# Patient Record
Sex: MALE | Race: WHITE | NOT HISPANIC OR LATINO | Employment: OTHER | URBAN - METROPOLITAN AREA
[De-identification: names, ages, dates, MRNs, and addresses within clinical notes are randomized per-mention and may not be internally consistent; named-entity substitution may affect disease eponyms.]

---

## 2020-01-01 ENCOUNTER — APPOINTMENT (OUTPATIENT)
Dept: GENERAL RADIOLOGY | Facility: HOSPITAL | Age: 65
End: 2020-01-01

## 2020-01-01 ENCOUNTER — APPOINTMENT (OUTPATIENT)
Dept: ULTRASOUND IMAGING | Facility: HOSPITAL | Age: 65
End: 2020-01-01

## 2020-01-01 ENCOUNTER — APPOINTMENT (OUTPATIENT)
Dept: CT IMAGING | Facility: HOSPITAL | Age: 65
End: 2020-01-01

## 2020-01-01 ENCOUNTER — ANESTHESIA (OUTPATIENT)
Dept: GASTROENTEROLOGY | Facility: HOSPITAL | Age: 65
End: 2020-01-01

## 2020-01-01 ENCOUNTER — ANESTHESIA EVENT (OUTPATIENT)
Dept: GASTROENTEROLOGY | Facility: HOSPITAL | Age: 65
End: 2020-01-01

## 2020-01-01 ENCOUNTER — HOSPITAL ENCOUNTER (INPATIENT)
Facility: HOSPITAL | Age: 65
LOS: 14 days | End: 2020-10-18
Attending: EMERGENCY MEDICINE | Admitting: INTERNAL MEDICINE

## 2020-01-01 ENCOUNTER — PREP FOR SURGERY (OUTPATIENT)
Dept: OTHER | Facility: HOSPITAL | Age: 65
End: 2020-01-01

## 2020-01-01 VITALS
SYSTOLIC BLOOD PRESSURE: 123 MMHG | RESPIRATION RATE: 33 BRPM | DIASTOLIC BLOOD PRESSURE: 67 MMHG | TEMPERATURE: 100.2 F | OXYGEN SATURATION: 94 %

## 2020-01-01 VITALS
BODY MASS INDEX: 30.07 KG/M2 | TEMPERATURE: 100.5 F | OXYGEN SATURATION: 41 % | RESPIRATION RATE: 17 BRPM | HEIGHT: 68 IN | WEIGHT: 198.41 LBS | DIASTOLIC BLOOD PRESSURE: 26 MMHG | SYSTOLIC BLOOD PRESSURE: 48 MMHG

## 2020-01-01 DIAGNOSIS — R55 SYNCOPE, UNSPECIFIED SYNCOPE TYPE: ICD-10-CM

## 2020-01-01 DIAGNOSIS — K74.60 HEPATIC CIRRHOSIS, UNSPECIFIED HEPATIC CIRRHOSIS TYPE, UNSPECIFIED WHETHER ASCITES PRESENT (HCC): ICD-10-CM

## 2020-01-01 DIAGNOSIS — R57.1 HYPOVOLEMIC SHOCK (HCC): ICD-10-CM

## 2020-01-01 DIAGNOSIS — K92.2 UPPER GI BLEED: Primary | ICD-10-CM

## 2020-01-01 DIAGNOSIS — S01.511A LIP LACERATION, INITIAL ENCOUNTER: ICD-10-CM

## 2020-01-01 DIAGNOSIS — K92.2 ACUTE UPPER GASTROINTESTINAL BLEEDING: ICD-10-CM

## 2020-01-01 LAB
ABO GROUP BLD: NORMAL
ALBUMIN FLD-MCNC: <0.4 G/DL
ALBUMIN SERPL-MCNC: 1.8 G/DL (ref 3.5–5.2)
ALBUMIN SERPL-MCNC: 2 G/DL (ref 3.5–5.2)
ALBUMIN SERPL-MCNC: 2.2 G/DL (ref 3.5–5.2)
ALBUMIN SERPL-MCNC: 2.3 G/DL (ref 3.5–5.2)
ALBUMIN SERPL-MCNC: 2.4 G/DL (ref 3.5–5.2)
ALBUMIN SERPL-MCNC: 2.5 G/DL (ref 3.5–5.2)
ALBUMIN SERPL-MCNC: 2.7 G/DL (ref 3.5–5.2)
ALBUMIN SERPL-MCNC: 2.7 G/DL (ref 3.5–5.2)
ALBUMIN SERPL-MCNC: 2.8 G/DL (ref 3.5–5.2)
ALBUMIN/GLOB SERPL: 0.5 G/DL
ALBUMIN/GLOB SERPL: 0.6 G/DL
ALBUMIN/GLOB SERPL: 0.7 G/DL
ALBUMIN/GLOB SERPL: 0.8 G/DL
ALBUMIN/GLOB SERPL: 0.9 G/DL
ALBUMIN/GLOB SERPL: 0.9 G/DL
ALBUMIN/GLOB SERPL: 1 G/DL
ALBUMIN/GLOB SERPL: 1.1 G/DL
ALBUMIN/GLOB SERPL: 1.4 G/DL
ALBUMIN/GLOB SERPL: 1.4 G/DL
ALP SERPL-CCNC: 100 U/L (ref 39–117)
ALP SERPL-CCNC: 101 U/L (ref 39–117)
ALP SERPL-CCNC: 110 U/L (ref 39–117)
ALP SERPL-CCNC: 121 U/L (ref 39–117)
ALP SERPL-CCNC: 130 U/L (ref 39–117)
ALP SERPL-CCNC: 150 U/L (ref 39–117)
ALP SERPL-CCNC: 68 U/L (ref 39–117)
ALP SERPL-CCNC: 70 U/L (ref 39–117)
ALP SERPL-CCNC: 81 U/L (ref 39–117)
ALP SERPL-CCNC: 82 U/L (ref 39–117)
ALP SERPL-CCNC: 89 U/L (ref 39–117)
ALP SERPL-CCNC: 90 U/L (ref 39–117)
ALP SERPL-CCNC: 98 U/L (ref 39–117)
ALP SERPL-CCNC: 98 U/L (ref 39–117)
ALT SERPL W P-5'-P-CCNC: 105 U/L (ref 1–41)
ALT SERPL W P-5'-P-CCNC: 123 U/L (ref 1–41)
ALT SERPL W P-5'-P-CCNC: 127 U/L (ref 1–41)
ALT SERPL W P-5'-P-CCNC: 145 U/L (ref 1–41)
ALT SERPL W P-5'-P-CCNC: 156 U/L (ref 1–41)
ALT SERPL W P-5'-P-CCNC: 16 U/L (ref 1–41)
ALT SERPL W P-5'-P-CCNC: 49 U/L (ref 1–41)
ALT SERPL W P-5'-P-CCNC: 52 U/L (ref 1–41)
ALT SERPL W P-5'-P-CCNC: 57 U/L (ref 1–41)
ALT SERPL W P-5'-P-CCNC: 61 U/L (ref 1–41)
ALT SERPL W P-5'-P-CCNC: 63 U/L (ref 1–41)
ALT SERPL W P-5'-P-CCNC: 71 U/L (ref 1–41)
ALT SERPL W P-5'-P-CCNC: 78 U/L (ref 1–41)
ALT SERPL W P-5'-P-CCNC: 87 U/L (ref 1–41)
AMMONIA BLD-SCNC: 23 UMOL/L (ref 16–60)
AMMONIA BLD-SCNC: 29 UMOL/L (ref 16–60)
AMMONIA BLD-SCNC: 32 UMOL/L (ref 16–60)
AMMONIA BLD-SCNC: 40 UMOL/L (ref 16–60)
AMMONIA BLD-SCNC: 60 UMOL/L (ref 16–60)
AMMONIA BLD-SCNC: 71 UMOL/L (ref 16–60)
AMMONIA BLD-SCNC: 86 UMOL/L (ref 16–60)
AMPHET+METHAMPHET UR QL: NEGATIVE
ANION GAP SERPL CALCULATED.3IONS-SCNC: 10 MMOL/L (ref 5–15)
ANION GAP SERPL CALCULATED.3IONS-SCNC: 11.2 MMOL/L (ref 5–15)
ANION GAP SERPL CALCULATED.3IONS-SCNC: 14.4 MMOL/L (ref 5–15)
ANION GAP SERPL CALCULATED.3IONS-SCNC: 19.1 MMOL/L (ref 5–15)
ANION GAP SERPL CALCULATED.3IONS-SCNC: 3.4 MMOL/L (ref 5–15)
ANION GAP SERPL CALCULATED.3IONS-SCNC: 4.4 MMOL/L (ref 5–15)
ANION GAP SERPL CALCULATED.3IONS-SCNC: 4.4 MMOL/L (ref 5–15)
ANION GAP SERPL CALCULATED.3IONS-SCNC: 5.5 MMOL/L (ref 5–15)
ANION GAP SERPL CALCULATED.3IONS-SCNC: 5.5 MMOL/L (ref 5–15)
ANION GAP SERPL CALCULATED.3IONS-SCNC: 5.8 MMOL/L (ref 5–15)
ANION GAP SERPL CALCULATED.3IONS-SCNC: 6 MMOL/L (ref 5–15)
ANION GAP SERPL CALCULATED.3IONS-SCNC: 6 MMOL/L (ref 5–15)
ANION GAP SERPL CALCULATED.3IONS-SCNC: 6.4 MMOL/L (ref 5–15)
ANION GAP SERPL CALCULATED.3IONS-SCNC: 6.6 MMOL/L (ref 5–15)
ANION GAP SERPL CALCULATED.3IONS-SCNC: 7.1 MMOL/L (ref 5–15)
ANION GAP SERPL CALCULATED.3IONS-SCNC: 7.7 MMOL/L (ref 5–15)
ANION GAP SERPL CALCULATED.3IONS-SCNC: 8.8 MMOL/L (ref 5–15)
ANISOCYTOSIS BLD QL: ABNORMAL
APPEARANCE FLD: CLEAR
APPEARANCE FLD: CLEAR
APTT PPP: 39.1 SECONDS (ref 22.7–35.4)
APTT PPP: 39.4 SECONDS (ref 22.7–35.4)
ARTERIAL PATENCY WRIST A: ABNORMAL
ARTERIAL PATENCY WRIST A: POSITIVE
AST SERPL-CCNC: 104 U/L (ref 1–40)
AST SERPL-CCNC: 111 U/L (ref 1–40)
AST SERPL-CCNC: 147 U/L (ref 1–40)
AST SERPL-CCNC: 171 U/L (ref 1–40)
AST SERPL-CCNC: 202 U/L (ref 1–40)
AST SERPL-CCNC: 232 U/L (ref 1–40)
AST SERPL-CCNC: 301 U/L (ref 1–40)
AST SERPL-CCNC: 339 U/L (ref 1–40)
AST SERPL-CCNC: 357 U/L (ref 1–40)
AST SERPL-CCNC: 50 U/L (ref 1–40)
AST SERPL-CCNC: 89 U/L (ref 1–40)
AST SERPL-CCNC: 90 U/L (ref 1–40)
AST SERPL-CCNC: 91 U/L (ref 1–40)
AST SERPL-CCNC: 99 U/L (ref 1–40)
ATMOSPHERIC PRESS: 747.3 MMHG
ATMOSPHERIC PRESS: 747.4 MMHG
ATMOSPHERIC PRESS: 748.2 MMHG
ATMOSPHERIC PRESS: 748.4 MMHG
ATMOSPHERIC PRESS: 749.3 MMHG
ATMOSPHERIC PRESS: 749.7 MMHG
ATMOSPHERIC PRESS: 750.6 MMHG
ATMOSPHERIC PRESS: 751.4 MMHG
ATMOSPHERIC PRESS: 751.8 MMHG
ATMOSPHERIC PRESS: 752.2 MMHG
ATMOSPHERIC PRESS: 752.5 MMHG
ATMOSPHERIC PRESS: 752.5 MMHG
ATMOSPHERIC PRESS: 753.1 MMHG
ATMOSPHERIC PRESS: 755.3 MMHG
ATMOSPHERIC PRESS: 756.2 MMHG
B PARAPERT DNA SPEC QL NAA+PROBE: NOT DETECTED
B PERT DNA SPEC QL NAA+PROBE: NOT DETECTED
BACTERIA FLD CULT: NORMAL
BACTERIA SPEC RESP CULT: NO GROWTH
BARBITURATES UR QL SCN: NEGATIVE
BASE EXCESS BLDA CALC-SCNC: -2.7 MMOL/L (ref 0–2)
BASE EXCESS BLDA CALC-SCNC: -6.4 MMOL/L (ref 0–2)
BASE EXCESS BLDA CALC-SCNC: -9.4 MMOL/L (ref 0–2)
BASE EXCESS BLDA CALC-SCNC: 3.8 MMOL/L (ref 0–2)
BASE EXCESS BLDA CALC-SCNC: 3.9 MMOL/L (ref 0–2)
BASE EXCESS BLDA CALC-SCNC: 4.7 MMOL/L (ref 0–2)
BASE EXCESS BLDA CALC-SCNC: 6.1 MMOL/L (ref 0–2)
BASE EXCESS BLDA CALC-SCNC: 6.4 MMOL/L (ref 0–2)
BASE EXCESS BLDA CALC-SCNC: 7.1 MMOL/L (ref 0–2)
BASE EXCESS BLDA CALC-SCNC: 7.3 MMOL/L (ref 0–2)
BASE EXCESS BLDA CALC-SCNC: 7.4 MMOL/L (ref 0–2)
BASE EXCESS BLDA CALC-SCNC: 7.4 MMOL/L (ref 0–2)
BASE EXCESS BLDA CALC-SCNC: 7.7 MMOL/L (ref 0–2)
BASOPHILS # BLD AUTO: 0.04 10*3/MM3 (ref 0–0.2)
BASOPHILS # BLD AUTO: 0.04 10*3/MM3 (ref 0–0.2)
BASOPHILS # BLD AUTO: 0.05 10*3/MM3 (ref 0–0.2)
BASOPHILS # BLD AUTO: 0.06 10*3/MM3 (ref 0–0.2)
BASOPHILS # BLD AUTO: 0.07 10*3/MM3 (ref 0–0.2)
BASOPHILS # BLD AUTO: 0.07 10*3/MM3 (ref 0–0.2)
BASOPHILS # BLD AUTO: 0.11 10*3/MM3 (ref 0–0.2)
BASOPHILS # BLD AUTO: 0.12 10*3/MM3 (ref 0–0.2)
BASOPHILS NFR BLD AUTO: 0.2 % (ref 0–1.5)
BASOPHILS NFR BLD AUTO: 0.3 % (ref 0–1.5)
BASOPHILS NFR BLD AUTO: 0.4 % (ref 0–1.5)
BASOPHILS NFR BLD AUTO: 0.4 % (ref 0–1.5)
BASOPHILS NFR BLD AUTO: 0.6 % (ref 0–1.5)
BASOPHILS NFR BLD AUTO: 0.6 % (ref 0–1.5)
BASOPHILS NFR BLD AUTO: 1.4 % (ref 0–1.5)
BDY SITE: ABNORMAL
BENZODIAZ UR QL SCN: NEGATIVE
BH BB BLOOD EXPIRATION DATE: NORMAL
BH BB BLOOD TYPE BARCODE: 5100
BH BB BLOOD TYPE BARCODE: 600
BH BB BLOOD TYPE BARCODE: 6200
BH BB BLOOD TYPE BARCODE: 6200
BH BB BLOOD TYPE BARCODE: 9500
BH BB BLOOD TYPE BARCODE: 9500
BH BB DISPENSE STATUS: NORMAL
BH BB PRODUCT CODE: NORMAL
BH BB UNIT NUMBER: NORMAL
BILIRUB SERPL-MCNC: 2.7 MG/DL (ref 0–1.2)
BILIRUB SERPL-MCNC: 3.6 MG/DL (ref 0–1.2)
BILIRUB SERPL-MCNC: 3.7 MG/DL (ref 0–1.2)
BILIRUB SERPL-MCNC: 5.9 MG/DL (ref 0–1.2)
BILIRUB SERPL-MCNC: 6 MG/DL (ref 0–1.2)
BILIRUB SERPL-MCNC: 6.1 MG/DL (ref 0–1.2)
BILIRUB SERPL-MCNC: 6.2 MG/DL (ref 0–1.2)
BILIRUB SERPL-MCNC: 6.6 MG/DL (ref 0–1.2)
BILIRUB SERPL-MCNC: 6.6 MG/DL (ref 0–1.2)
BILIRUB SERPL-MCNC: 6.9 MG/DL (ref 0–1.2)
BILIRUB SERPL-MCNC: 7.1 MG/DL (ref 0–1.2)
BILIRUB SERPL-MCNC: 7.5 MG/DL (ref 0–1.2)
BILIRUB SERPL-MCNC: 7.7 MG/DL (ref 0–1.2)
BILIRUB SERPL-MCNC: 7.9 MG/DL (ref 0–1.2)
BLD GP AB SCN SERPL QL: NEGATIVE
BUN SERPL-MCNC: 11 MG/DL (ref 8–23)
BUN SERPL-MCNC: 12 MG/DL (ref 8–23)
BUN SERPL-MCNC: 17 MG/DL (ref 8–23)
BUN SERPL-MCNC: 20 MG/DL (ref 8–23)
BUN SERPL-MCNC: 28 MG/DL (ref 8–23)
BUN SERPL-MCNC: 28 MG/DL (ref 8–23)
BUN SERPL-MCNC: 30 MG/DL (ref 8–23)
BUN SERPL-MCNC: 32 MG/DL (ref 8–23)
BUN SERPL-MCNC: 32 MG/DL (ref 8–23)
BUN SERPL-MCNC: 36 MG/DL (ref 8–23)
BUN SERPL-MCNC: 36 MG/DL (ref 8–23)
BUN SERPL-MCNC: 38 MG/DL (ref 8–23)
BUN SERPL-MCNC: 39 MG/DL (ref 8–23)
BUN SERPL-MCNC: 39 MG/DL (ref 8–23)
BUN SERPL-MCNC: 40 MG/DL (ref 8–23)
BUN SERPL-MCNC: 6 MG/DL (ref 8–23)
BUN SERPL-MCNC: 7 MG/DL (ref 8–23)
BUN/CREAT SERPL: 12.9 (ref 7–25)
BUN/CREAT SERPL: 13.5 (ref 7–25)
BUN/CREAT SERPL: 21 (ref 7–25)
BUN/CREAT SERPL: 25.6 (ref 7–25)
BUN/CREAT SERPL: 27.8 (ref 7–25)
BUN/CREAT SERPL: 35.9 (ref 7–25)
BUN/CREAT SERPL: 36.8 (ref 7–25)
BUN/CREAT SERPL: 37.3 (ref 7–25)
BUN/CREAT SERPL: 37.4 (ref 7–25)
BUN/CREAT SERPL: 37.5 (ref 7–25)
BUN/CREAT SERPL: 37.6 (ref 7–25)
BUN/CREAT SERPL: 40.6 (ref 7–25)
BUN/CREAT SERPL: 41.4 (ref 7–25)
BUN/CREAT SERPL: 41.9 (ref 7–25)
BUN/CREAT SERPL: 45.1 (ref 7–25)
BUN/CREAT SERPL: 7.6 (ref 7–25)
BUN/CREAT SERPL: 9.6 (ref 7–25)
C PNEUM DNA NPH QL NAA+NON-PROBE: NOT DETECTED
CA-I BLD-MCNC: 4 MG/DL (ref 4.6–5.4)
CA-I SERPL ISE-MCNC: 1 MMOL/L (ref 1.15–1.35)
CALCIUM SPEC-SCNC: 7 MG/DL (ref 8.6–10.5)
CALCIUM SPEC-SCNC: 7 MG/DL (ref 8.6–10.5)
CALCIUM SPEC-SCNC: 7.2 MG/DL (ref 8.6–10.5)
CALCIUM SPEC-SCNC: 7.3 MG/DL (ref 8.6–10.5)
CALCIUM SPEC-SCNC: 7.4 MG/DL (ref 8.6–10.5)
CALCIUM SPEC-SCNC: 7.4 MG/DL (ref 8.6–10.5)
CALCIUM SPEC-SCNC: 7.5 MG/DL (ref 8.6–10.5)
CALCIUM SPEC-SCNC: 7.6 MG/DL (ref 8.6–10.5)
CALCIUM SPEC-SCNC: 7.6 MG/DL (ref 8.6–10.5)
CALCIUM SPEC-SCNC: 7.7 MG/DL (ref 8.6–10.5)
CALCIUM SPEC-SCNC: 7.8 MG/DL (ref 8.6–10.5)
CALCIUM SPEC-SCNC: 7.9 MG/DL (ref 8.6–10.5)
CALCIUM SPEC-SCNC: 8 MG/DL (ref 8.6–10.5)
CANNABINOIDS SERPL QL: NEGATIVE
CHLORIDE SERPL-SCNC: 102 MMOL/L (ref 98–107)
CHLORIDE SERPL-SCNC: 104 MMOL/L (ref 98–107)
CHLORIDE SERPL-SCNC: 105 MMOL/L (ref 98–107)
CHLORIDE SERPL-SCNC: 105 MMOL/L (ref 98–107)
CHLORIDE SERPL-SCNC: 106 MMOL/L (ref 98–107)
CHLORIDE SERPL-SCNC: 107 MMOL/L (ref 98–107)
CHLORIDE SERPL-SCNC: 108 MMOL/L (ref 98–107)
CHLORIDE SERPL-SCNC: 111 MMOL/L (ref 98–107)
CHLORIDE SERPL-SCNC: 111 MMOL/L (ref 98–107)
CHLORIDE SERPL-SCNC: 112 MMOL/L (ref 98–107)
CHLORIDE SERPL-SCNC: 115 MMOL/L (ref 98–107)
CHLORIDE SERPL-SCNC: 116 MMOL/L (ref 98–107)
CHLORIDE SERPL-SCNC: 117 MMOL/L (ref 98–107)
CHLORIDE SERPL-SCNC: 118 MMOL/L (ref 98–107)
CHLORIDE SERPL-SCNC: 118 MMOL/L (ref 98–107)
CO2 SERPL-SCNC: 17.8 MMOL/L (ref 22–29)
CO2 SERPL-SCNC: 17.9 MMOL/L (ref 22–29)
CO2 SERPL-SCNC: 20.6 MMOL/L (ref 22–29)
CO2 SERPL-SCNC: 21 MMOL/L (ref 22–29)
CO2 SERPL-SCNC: 23.6 MMOL/L (ref 22–29)
CO2 SERPL-SCNC: 25.2 MMOL/L (ref 22–29)
CO2 SERPL-SCNC: 27.6 MMOL/L (ref 22–29)
CO2 SERPL-SCNC: 28.2 MMOL/L (ref 22–29)
CO2 SERPL-SCNC: 28.3 MMOL/L (ref 22–29)
CO2 SERPL-SCNC: 28.4 MMOL/L (ref 22–29)
CO2 SERPL-SCNC: 28.6 MMOL/L (ref 22–29)
CO2 SERPL-SCNC: 29 MMOL/L (ref 22–29)
CO2 SERPL-SCNC: 29.6 MMOL/L (ref 22–29)
CO2 SERPL-SCNC: 29.9 MMOL/L (ref 22–29)
CO2 SERPL-SCNC: 30 MMOL/L (ref 22–29)
CO2 SERPL-SCNC: 30.5 MMOL/L (ref 22–29)
CO2 SERPL-SCNC: 30.5 MMOL/L (ref 22–29)
COCAINE UR QL: NEGATIVE
COLOR FLD: YELLOW
COLOR FLD: YELLOW
CREAT SERPL-MCNC: 0.71 MG/DL (ref 0.76–1.27)
CREAT SERPL-MCNC: 0.73 MG/DL (ref 0.76–1.27)
CREAT SERPL-MCNC: 0.76 MG/DL (ref 0.76–1.27)
CREAT SERPL-MCNC: 0.78 MG/DL (ref 0.76–1.27)
CREAT SERPL-MCNC: 0.78 MG/DL (ref 0.76–1.27)
CREAT SERPL-MCNC: 0.79 MG/DL (ref 0.76–1.27)
CREAT SERPL-MCNC: 0.81 MG/DL (ref 0.76–1.27)
CREAT SERPL-MCNC: 0.85 MG/DL (ref 0.76–1.27)
CREAT SERPL-MCNC: 0.85 MG/DL (ref 0.76–1.27)
CREAT SERPL-MCNC: 0.86 MG/DL (ref 0.76–1.27)
CREAT SERPL-MCNC: 0.87 MG/DL (ref 0.76–1.27)
CREAT SERPL-MCNC: 0.89 MG/DL (ref 0.76–1.27)
CREAT SERPL-MCNC: 0.96 MG/DL (ref 0.76–1.27)
CREAT SERPL-MCNC: 1.02 MG/DL (ref 0.76–1.27)
CREAT SERPL-MCNC: 1.04 MG/DL (ref 0.76–1.27)
CREAT SERPL-MCNC: 1.07 MG/DL (ref 0.76–1.27)
CREAT SERPL-MCNC: 1.08 MG/DL (ref 0.76–1.27)
CROSSMATCH INTERPRETATION: NORMAL
D DIMER PPP FEU-MCNC: 5.32 MCGFEU/ML (ref 0–0.49)
D DIMER PPP FEU-MCNC: 6.71 MCGFEU/ML (ref 0–0.49)
D-LACTATE SERPL-SCNC: 1.7 MMOL/L (ref 0.5–2)
D-LACTATE SERPL-SCNC: 13.3 MMOL/L (ref 0.5–2)
D-LACTATE SERPL-SCNC: 2.1 MMOL/L (ref 0.5–2)
D-LACTATE SERPL-SCNC: 2.1 MMOL/L (ref 0.5–2)
D-LACTATE SERPL-SCNC: 2.4 MMOL/L (ref 0.5–2)
D-LACTATE SERPL-SCNC: 2.6 MMOL/L (ref 0.5–2)
D-LACTATE SERPL-SCNC: 2.7 MMOL/L (ref 0.5–2)
D-LACTATE SERPL-SCNC: 3.2 MMOL/L (ref 0.5–2)
D-LACTATE SERPL-SCNC: 6 MMOL/L (ref 0.5–2)
D-LACTATE SERPL-SCNC: 6.1 MMOL/L (ref 0.5–2)
D-LACTATE SERPL-SCNC: 9.9 MMOL/L (ref 0.5–2)
DEPRECATED RDW RBC AUTO: 109.3 FL (ref 37–54)
DEPRECATED RDW RBC AUTO: 44 FL (ref 37–54)
DEPRECATED RDW RBC AUTO: 44.3 FL (ref 37–54)
DEPRECATED RDW RBC AUTO: 44.7 FL (ref 37–54)
DEPRECATED RDW RBC AUTO: 44.9 FL (ref 37–54)
DEPRECATED RDW RBC AUTO: 45.5 FL (ref 37–54)
DEPRECATED RDW RBC AUTO: 45.8 FL (ref 37–54)
DEPRECATED RDW RBC AUTO: 45.8 FL (ref 37–54)
DEPRECATED RDW RBC AUTO: 46.2 FL (ref 37–54)
DEPRECATED RDW RBC AUTO: 46.3 FL (ref 37–54)
DEPRECATED RDW RBC AUTO: 46.9 FL (ref 37–54)
DEPRECATED RDW RBC AUTO: 46.9 FL (ref 37–54)
DEPRECATED RDW RBC AUTO: 47.4 FL (ref 37–54)
DEPRECATED RDW RBC AUTO: 47.5 FL (ref 37–54)
DEPRECATED RDW RBC AUTO: 47.6 FL (ref 37–54)
DEPRECATED RDW RBC AUTO: 47.7 FL (ref 37–54)
DEPRECATED RDW RBC AUTO: 47.9 FL (ref 37–54)
DEPRECATED RDW RBC AUTO: 48.3 FL (ref 37–54)
DEPRECATED RDW RBC AUTO: 48.4 FL (ref 37–54)
DEPRECATED RDW RBC AUTO: 48.9 FL (ref 37–54)
DEPRECATED RDW RBC AUTO: 50.1 FL (ref 37–54)
DEPRECATED RDW RBC AUTO: 51.6 FL (ref 37–54)
DEPRECATED RDW RBC AUTO: 51.8 FL (ref 37–54)
DEPRECATED RDW RBC AUTO: 54.4 FL (ref 37–54)
DEPRECATED RDW RBC AUTO: 56.5 FL (ref 37–54)
DEPRECATED RDW RBC AUTO: 58.3 FL (ref 37–54)
DEPRECATED RDW RBC AUTO: 59 FL (ref 37–54)
DEPRECATED RDW RBC AUTO: 59.9 FL (ref 37–54)
DEPRECATED RDW RBC AUTO: 61.4 FL (ref 37–54)
DEPRECATED RDW RBC AUTO: 62.7 FL (ref 37–54)
DEPRECATED RDW RBC AUTO: 64.3 FL (ref 37–54)
EOSINOPHIL # BLD AUTO: 0.01 10*3/MM3 (ref 0–0.4)
EOSINOPHIL # BLD AUTO: 0.02 10*3/MM3 (ref 0–0.4)
EOSINOPHIL # BLD AUTO: 0.06 10*3/MM3 (ref 0–0.4)
EOSINOPHIL # BLD AUTO: 0.09 10*3/MM3 (ref 0–0.4)
EOSINOPHIL # BLD AUTO: 0.15 10*3/MM3 (ref 0–0.4)
EOSINOPHIL # BLD AUTO: 0.16 10*3/MM3 (ref 0–0.4)
EOSINOPHIL # BLD AUTO: 0.18 10*3/MM3 (ref 0–0.4)
EOSINOPHIL # BLD AUTO: 0.35 10*3/MM3 (ref 0–0.4)
EOSINOPHIL # BLD AUTO: 0.38 10*3/MM3 (ref 0–0.4)
EOSINOPHIL # BLD AUTO: 0.44 10*3/MM3 (ref 0–0.4)
EOSINOPHIL NFR BLD AUTO: 0.1 % (ref 0.3–6.2)
EOSINOPHIL NFR BLD AUTO: 0.1 % (ref 0.3–6.2)
EOSINOPHIL NFR BLD AUTO: 0.3 % (ref 0.3–6.2)
EOSINOPHIL NFR BLD AUTO: 0.5 % (ref 0.3–6.2)
EOSINOPHIL NFR BLD AUTO: 0.9 % (ref 0.3–6.2)
EOSINOPHIL NFR BLD AUTO: 1.3 % (ref 0.3–6.2)
EOSINOPHIL NFR BLD AUTO: 1.7 % (ref 0.3–6.2)
EOSINOPHIL NFR BLD AUTO: 1.8 % (ref 0.3–6.2)
EOSINOPHIL NFR BLD AUTO: 1.9 % (ref 0.3–6.2)
EOSINOPHIL NFR BLD AUTO: 2.3 % (ref 0.3–6.2)
ERYTHROCYTE [DISTWIDTH] IN BLOOD BY AUTOMATED COUNT: 13.7 % (ref 12.3–15.4)
ERYTHROCYTE [DISTWIDTH] IN BLOOD BY AUTOMATED COUNT: 13.9 % (ref 12.3–15.4)
ERYTHROCYTE [DISTWIDTH] IN BLOOD BY AUTOMATED COUNT: 14 % (ref 12.3–15.4)
ERYTHROCYTE [DISTWIDTH] IN BLOOD BY AUTOMATED COUNT: 14.2 % (ref 12.3–15.4)
ERYTHROCYTE [DISTWIDTH] IN BLOOD BY AUTOMATED COUNT: 14.3 % (ref 12.3–15.4)
ERYTHROCYTE [DISTWIDTH] IN BLOOD BY AUTOMATED COUNT: 14.4 % (ref 12.3–15.4)
ERYTHROCYTE [DISTWIDTH] IN BLOOD BY AUTOMATED COUNT: 14.4 % (ref 12.3–15.4)
ERYTHROCYTE [DISTWIDTH] IN BLOOD BY AUTOMATED COUNT: 14.5 % (ref 12.3–15.4)
ERYTHROCYTE [DISTWIDTH] IN BLOOD BY AUTOMATED COUNT: 14.5 % (ref 12.3–15.4)
ERYTHROCYTE [DISTWIDTH] IN BLOOD BY AUTOMATED COUNT: 14.7 % (ref 12.3–15.4)
ERYTHROCYTE [DISTWIDTH] IN BLOOD BY AUTOMATED COUNT: 14.8 % (ref 12.3–15.4)
ERYTHROCYTE [DISTWIDTH] IN BLOOD BY AUTOMATED COUNT: 14.8 % (ref 12.3–15.4)
ERYTHROCYTE [DISTWIDTH] IN BLOOD BY AUTOMATED COUNT: 14.9 % (ref 12.3–15.4)
ERYTHROCYTE [DISTWIDTH] IN BLOOD BY AUTOMATED COUNT: 15 % (ref 12.3–15.4)
ERYTHROCYTE [DISTWIDTH] IN BLOOD BY AUTOMATED COUNT: 15.1 % (ref 12.3–15.4)
ERYTHROCYTE [DISTWIDTH] IN BLOOD BY AUTOMATED COUNT: 15.2 % (ref 12.3–15.4)
ERYTHROCYTE [DISTWIDTH] IN BLOOD BY AUTOMATED COUNT: 15.3 % (ref 12.3–15.4)
ERYTHROCYTE [DISTWIDTH] IN BLOOD BY AUTOMATED COUNT: 15.3 % (ref 12.3–15.4)
ERYTHROCYTE [DISTWIDTH] IN BLOOD BY AUTOMATED COUNT: 15.9 % (ref 12.3–15.4)
ERYTHROCYTE [DISTWIDTH] IN BLOOD BY AUTOMATED COUNT: 16.1 % (ref 12.3–15.4)
ERYTHROCYTE [DISTWIDTH] IN BLOOD BY AUTOMATED COUNT: 16.7 % (ref 12.3–15.4)
ERYTHROCYTE [DISTWIDTH] IN BLOOD BY AUTOMATED COUNT: 17 % (ref 12.3–15.4)
ERYTHROCYTE [DISTWIDTH] IN BLOOD BY AUTOMATED COUNT: 17.2 % (ref 12.3–15.4)
ERYTHROCYTE [DISTWIDTH] IN BLOOD BY AUTOMATED COUNT: 17.8 % (ref 12.3–15.4)
ERYTHROCYTE [DISTWIDTH] IN BLOOD BY AUTOMATED COUNT: 17.9 % (ref 12.3–15.4)
ERYTHROCYTE [DISTWIDTH] IN BLOOD BY AUTOMATED COUNT: 18.3 % (ref 12.3–15.4)
ERYTHROCYTE [DISTWIDTH] IN BLOOD BY AUTOMATED COUNT: 18.5 % (ref 12.3–15.4)
ERYTHROCYTE [DISTWIDTH] IN BLOOD BY AUTOMATED COUNT: 18.9 % (ref 12.3–15.4)
ERYTHROCYTE [DISTWIDTH] IN BLOOD BY AUTOMATED COUNT: 19 % (ref 12.3–15.4)
ERYTHROCYTE [DISTWIDTH] IN BLOOD BY AUTOMATED COUNT: 19.1 % (ref 12.3–15.4)
ERYTHROCYTE [DISTWIDTH] IN BLOOD BY AUTOMATED COUNT: 23.1 % (ref 12.3–15.4)
FIBRINOGEN PPP-MCNC: 157 MG/DL (ref 219–464)
FIBRINOGEN PPP-MCNC: 169 MG/DL (ref 219–464)
FLUAV H1 2009 PAND RNA NPH QL NAA+PROBE: NOT DETECTED
FLUAV H1 HA GENE NPH QL NAA+PROBE: NOT DETECTED
FLUAV H3 RNA NPH QL NAA+PROBE: NOT DETECTED
FLUAV SUBTYP SPEC NAA+PROBE: NOT DETECTED
FLUBV RNA ISLT QL NAA+PROBE: NOT DETECTED
GFR SERPL CREATININE-BSD FRML MDRD: 100 ML/MIN/1.73
GFR SERPL CREATININE-BSD FRML MDRD: 100 ML/MIN/1.73
GFR SERPL CREATININE-BSD FRML MDRD: 103 ML/MIN/1.73
GFR SERPL CREATININE-BSD FRML MDRD: 108 ML/MIN/1.73
GFR SERPL CREATININE-BSD FRML MDRD: 111 ML/MIN/1.73
GFR SERPL CREATININE-BSD FRML MDRD: 119 ML/MIN/1.73
GFR SERPL CREATININE-BSD FRML MDRD: 69 ML/MIN/1.73
GFR SERPL CREATININE-BSD FRML MDRD: 69 ML/MIN/1.73
GFR SERPL CREATININE-BSD FRML MDRD: 72 ML/MIN/1.73
GFR SERPL CREATININE-BSD FRML MDRD: 73 ML/MIN/1.73
GFR SERPL CREATININE-BSD FRML MDRD: 79 ML/MIN/1.73
GFR SERPL CREATININE-BSD FRML MDRD: 86 ML/MIN/1.73
GFR SERPL CREATININE-BSD FRML MDRD: 88 ML/MIN/1.73
GFR SERPL CREATININE-BSD FRML MDRD: 89 ML/MIN/1.73
GFR SERPL CREATININE-BSD FRML MDRD: 90 ML/MIN/1.73
GFR SERPL CREATININE-BSD FRML MDRD: 90 ML/MIN/1.73
GFR SERPL CREATININE-BSD FRML MDRD: 96 ML/MIN/1.73
GFR SERPL CREATININE-BSD FRML MDRD: 98 ML/MIN/1.73
GLOBULIN UR ELPH-MCNC: 1.7 GM/DL
GLOBULIN UR ELPH-MCNC: 1.7 GM/DL
GLOBULIN UR ELPH-MCNC: 2.2 GM/DL
GLOBULIN UR ELPH-MCNC: 2.5 GM/DL
GLOBULIN UR ELPH-MCNC: 2.5 GM/DL
GLOBULIN UR ELPH-MCNC: 2.7 GM/DL
GLOBULIN UR ELPH-MCNC: 2.8 GM/DL
GLOBULIN UR ELPH-MCNC: 2.9 GM/DL
GLOBULIN UR ELPH-MCNC: 3 GM/DL
GLOBULIN UR ELPH-MCNC: 3.1 GM/DL
GLOBULIN UR ELPH-MCNC: 3.1 GM/DL
GLOBULIN UR ELPH-MCNC: 3.2 GM/DL
GLOBULIN UR ELPH-MCNC: 3.5 GM/DL
GLOBULIN UR ELPH-MCNC: 3.7 GM/DL
GLUCOSE BLDC GLUCOMTR-MCNC: 120 MG/DL (ref 70–130)
GLUCOSE BLDC GLUCOMTR-MCNC: 129 MG/DL (ref 70–130)
GLUCOSE BLDC GLUCOMTR-MCNC: 138 MG/DL (ref 70–130)
GLUCOSE BLDC GLUCOMTR-MCNC: 139 MG/DL (ref 70–130)
GLUCOSE BLDC GLUCOMTR-MCNC: 139 MG/DL (ref 70–130)
GLUCOSE BLDC GLUCOMTR-MCNC: 140 MG/DL (ref 70–130)
GLUCOSE BLDC GLUCOMTR-MCNC: 141 MG/DL (ref 70–130)
GLUCOSE BLDC GLUCOMTR-MCNC: 141 MG/DL (ref 70–130)
GLUCOSE BLDC GLUCOMTR-MCNC: 145 MG/DL (ref 70–130)
GLUCOSE BLDC GLUCOMTR-MCNC: 146 MG/DL (ref 70–130)
GLUCOSE BLDC GLUCOMTR-MCNC: 149 MG/DL (ref 70–130)
GLUCOSE BLDC GLUCOMTR-MCNC: 151 MG/DL (ref 70–130)
GLUCOSE BLDC GLUCOMTR-MCNC: 151 MG/DL (ref 70–130)
GLUCOSE BLDC GLUCOMTR-MCNC: 152 MG/DL (ref 70–130)
GLUCOSE BLDC GLUCOMTR-MCNC: 152 MG/DL (ref 70–130)
GLUCOSE BLDC GLUCOMTR-MCNC: 154 MG/DL (ref 70–130)
GLUCOSE BLDC GLUCOMTR-MCNC: 154 MG/DL (ref 70–130)
GLUCOSE BLDC GLUCOMTR-MCNC: 155 MG/DL (ref 70–130)
GLUCOSE BLDC GLUCOMTR-MCNC: 156 MG/DL (ref 70–130)
GLUCOSE BLDC GLUCOMTR-MCNC: 156 MG/DL (ref 70–130)
GLUCOSE BLDC GLUCOMTR-MCNC: 157 MG/DL (ref 70–130)
GLUCOSE BLDC GLUCOMTR-MCNC: 157 MG/DL (ref 70–130)
GLUCOSE BLDC GLUCOMTR-MCNC: 158 MG/DL (ref 70–130)
GLUCOSE BLDC GLUCOMTR-MCNC: 163 MG/DL (ref 70–130)
GLUCOSE BLDC GLUCOMTR-MCNC: 166 MG/DL (ref 70–130)
GLUCOSE BLDC GLUCOMTR-MCNC: 166 MG/DL (ref 70–130)
GLUCOSE BLDC GLUCOMTR-MCNC: 167 MG/DL (ref 70–130)
GLUCOSE BLDC GLUCOMTR-MCNC: 168 MG/DL (ref 70–130)
GLUCOSE BLDC GLUCOMTR-MCNC: 168 MG/DL (ref 70–130)
GLUCOSE BLDC GLUCOMTR-MCNC: 169 MG/DL (ref 70–130)
GLUCOSE BLDC GLUCOMTR-MCNC: 169 MG/DL (ref 70–130)
GLUCOSE BLDC GLUCOMTR-MCNC: 171 MG/DL (ref 70–130)
GLUCOSE BLDC GLUCOMTR-MCNC: 172 MG/DL (ref 70–130)
GLUCOSE BLDC GLUCOMTR-MCNC: 174 MG/DL (ref 70–130)
GLUCOSE BLDC GLUCOMTR-MCNC: 175 MG/DL (ref 70–130)
GLUCOSE BLDC GLUCOMTR-MCNC: 175 MG/DL (ref 70–130)
GLUCOSE BLDC GLUCOMTR-MCNC: 176 MG/DL (ref 70–130)
GLUCOSE BLDC GLUCOMTR-MCNC: 178 MG/DL (ref 70–130)
GLUCOSE BLDC GLUCOMTR-MCNC: 179 MG/DL (ref 70–130)
GLUCOSE BLDC GLUCOMTR-MCNC: 181 MG/DL (ref 70–130)
GLUCOSE BLDC GLUCOMTR-MCNC: 184 MG/DL (ref 70–130)
GLUCOSE BLDC GLUCOMTR-MCNC: 187 MG/DL (ref 70–130)
GLUCOSE BLDC GLUCOMTR-MCNC: 191 MG/DL (ref 70–130)
GLUCOSE BLDC GLUCOMTR-MCNC: 193 MG/DL (ref 70–130)
GLUCOSE BLDC GLUCOMTR-MCNC: 194 MG/DL (ref 70–130)
GLUCOSE BLDC GLUCOMTR-MCNC: 194 MG/DL (ref 70–130)
GLUCOSE BLDC GLUCOMTR-MCNC: 196 MG/DL (ref 70–130)
GLUCOSE BLDC GLUCOMTR-MCNC: 199 MG/DL (ref 70–130)
GLUCOSE BLDC GLUCOMTR-MCNC: 200 MG/DL (ref 70–130)
GLUCOSE BLDC GLUCOMTR-MCNC: 202 MG/DL (ref 70–130)
GLUCOSE BLDC GLUCOMTR-MCNC: 204 MG/DL (ref 70–130)
GLUCOSE BLDC GLUCOMTR-MCNC: 205 MG/DL (ref 70–130)
GLUCOSE BLDC GLUCOMTR-MCNC: 206 MG/DL (ref 70–130)
GLUCOSE BLDC GLUCOMTR-MCNC: 209 MG/DL (ref 70–130)
GLUCOSE BLDC GLUCOMTR-MCNC: 214 MG/DL (ref 70–130)
GLUCOSE BLDC GLUCOMTR-MCNC: 216 MG/DL (ref 70–130)
GLUCOSE BLDC GLUCOMTR-MCNC: 216 MG/DL (ref 70–130)
GLUCOSE BLDC GLUCOMTR-MCNC: 218 MG/DL (ref 70–130)
GLUCOSE BLDC GLUCOMTR-MCNC: 219 MG/DL (ref 70–130)
GLUCOSE BLDC GLUCOMTR-MCNC: 219 MG/DL (ref 70–130)
GLUCOSE BLDC GLUCOMTR-MCNC: 221 MG/DL (ref 70–130)
GLUCOSE BLDC GLUCOMTR-MCNC: 223 MG/DL (ref 70–130)
GLUCOSE BLDC GLUCOMTR-MCNC: 223 MG/DL (ref 70–130)
GLUCOSE BLDC GLUCOMTR-MCNC: 225 MG/DL (ref 70–130)
GLUCOSE BLDC GLUCOMTR-MCNC: 227 MG/DL (ref 70–130)
GLUCOSE BLDC GLUCOMTR-MCNC: 227 MG/DL (ref 70–130)
GLUCOSE BLDC GLUCOMTR-MCNC: 231 MG/DL (ref 70–130)
GLUCOSE BLDC GLUCOMTR-MCNC: 244 MG/DL (ref 70–130)
GLUCOSE BLDC GLUCOMTR-MCNC: 249 MG/DL (ref 70–130)
GLUCOSE BLDC GLUCOMTR-MCNC: 251 MG/DL (ref 70–130)
GLUCOSE BLDC GLUCOMTR-MCNC: 251 MG/DL (ref 70–130)
GLUCOSE BLDC GLUCOMTR-MCNC: 252 MG/DL (ref 70–130)
GLUCOSE BLDC GLUCOMTR-MCNC: 255 MG/DL (ref 70–130)
GLUCOSE BLDC GLUCOMTR-MCNC: 259 MG/DL (ref 70–130)
GLUCOSE BLDC GLUCOMTR-MCNC: 260 MG/DL (ref 70–130)
GLUCOSE BLDC GLUCOMTR-MCNC: 295 MG/DL (ref 70–130)
GLUCOSE BLDC GLUCOMTR-MCNC: >599 MG/DL (ref 70–130)
GLUCOSE BLDC GLUCOMTR-MCNC: >599 MG/DL (ref 70–130)
GLUCOSE SERPL-MCNC: 136 MG/DL (ref 65–99)
GLUCOSE SERPL-MCNC: 146 MG/DL (ref 65–99)
GLUCOSE SERPL-MCNC: 146 MG/DL (ref 65–99)
GLUCOSE SERPL-MCNC: 153 MG/DL (ref 65–99)
GLUCOSE SERPL-MCNC: 158 MG/DL (ref 65–99)
GLUCOSE SERPL-MCNC: 158 MG/DL (ref 65–99)
GLUCOSE SERPL-MCNC: 160 MG/DL (ref 65–99)
GLUCOSE SERPL-MCNC: 163 MG/DL (ref 65–99)
GLUCOSE SERPL-MCNC: 171 MG/DL (ref 65–99)
GLUCOSE SERPL-MCNC: 190 MG/DL (ref 65–99)
GLUCOSE SERPL-MCNC: 192 MG/DL (ref 65–99)
GLUCOSE SERPL-MCNC: 199 MG/DL (ref 65–99)
GLUCOSE SERPL-MCNC: 211 MG/DL (ref 65–99)
GLUCOSE SERPL-MCNC: 213 MG/DL (ref 65–99)
GLUCOSE SERPL-MCNC: 217 MG/DL (ref 65–99)
GLUCOSE SERPL-MCNC: 238 MG/DL (ref 65–99)
GLUCOSE SERPL-MCNC: 242 MG/DL (ref 65–99)
GRAM STN SPEC: NORMAL
HADV DNA SPEC NAA+PROBE: NOT DETECTED
HAV IGM SERPL QL IA: NEGATIVE
HBV CORE IGM SERPL QL IA: NEGATIVE
HBV SURFACE AG SERPL QL IA: NEGATIVE
HCO3 BLDA-SCNC: 18.9 MMOL/L (ref 22–28)
HCO3 BLDA-SCNC: 18.9 MMOL/L (ref 22–28)
HCO3 BLDA-SCNC: 20.6 MMOL/L (ref 22–28)
HCO3 BLDA-SCNC: 26.3 MMOL/L (ref 22–28)
HCO3 BLDA-SCNC: 27.4 MMOL/L (ref 22–28)
HCO3 BLDA-SCNC: 27.9 MMOL/L (ref 22–28)
HCO3 BLDA-SCNC: 28 MMOL/L (ref 22–28)
HCO3 BLDA-SCNC: 29.6 MMOL/L (ref 22–28)
HCO3 BLDA-SCNC: 30 MMOL/L (ref 22–28)
HCO3 BLDA-SCNC: 30.2 MMOL/L (ref 22–28)
HCO3 BLDA-SCNC: 30.4 MMOL/L (ref 22–28)
HCO3 BLDA-SCNC: 30.5 MMOL/L (ref 22–28)
HCO3 BLDA-SCNC: 30.5 MMOL/L (ref 22–28)
HCO3 BLDA-SCNC: 30.8 MMOL/L (ref 22–28)
HCO3 BLDA-SCNC: 31.2 MMOL/L (ref 22–28)
HCOV 229E RNA SPEC QL NAA+PROBE: NOT DETECTED
HCOV HKU1 RNA SPEC QL NAA+PROBE: NOT DETECTED
HCOV NL63 RNA SPEC QL NAA+PROBE: NOT DETECTED
HCOV OC43 RNA SPEC QL NAA+PROBE: NOT DETECTED
HCT VFR BLD AUTO: 17.7 % (ref 37.5–51)
HCT VFR BLD AUTO: 19.6 % (ref 37.5–51)
HCT VFR BLD AUTO: 22.9 % (ref 37.5–51)
HCT VFR BLD AUTO: 23 % (ref 37.5–51)
HCT VFR BLD AUTO: 23.5 % (ref 37.5–51)
HCT VFR BLD AUTO: 23.5 % (ref 37.5–51)
HCT VFR BLD AUTO: 23.6 % (ref 37.5–51)
HCT VFR BLD AUTO: 23.7 % (ref 37.5–51)
HCT VFR BLD AUTO: 23.8 % (ref 37.5–51)
HCT VFR BLD AUTO: 23.9 % (ref 37.5–51)
HCT VFR BLD AUTO: 23.9 % (ref 37.5–51)
HCT VFR BLD AUTO: 24.1 % (ref 37.5–51)
HCT VFR BLD AUTO: 24.2 % (ref 37.5–51)
HCT VFR BLD AUTO: 24.2 % (ref 37.5–51)
HCT VFR BLD AUTO: 24.3 % (ref 37.5–51)
HCT VFR BLD AUTO: 24.4 % (ref 37.5–51)
HCT VFR BLD AUTO: 24.4 % (ref 37.5–51)
HCT VFR BLD AUTO: 24.5 % (ref 37.5–51)
HCT VFR BLD AUTO: 24.5 % (ref 37.5–51)
HCT VFR BLD AUTO: 24.6 % (ref 37.5–51)
HCT VFR BLD AUTO: 24.6 % (ref 37.5–51)
HCT VFR BLD AUTO: 24.9 % (ref 37.5–51)
HCT VFR BLD AUTO: 25 % (ref 37.5–51)
HCT VFR BLD AUTO: 25 % (ref 37.5–51)
HCT VFR BLD AUTO: 25.1 % (ref 37.5–51)
HCT VFR BLD AUTO: 25.2 % (ref 37.5–51)
HCT VFR BLD AUTO: 25.3 % (ref 37.5–51)
HCT VFR BLD AUTO: 25.6 % (ref 37.5–51)
HCT VFR BLD AUTO: 25.8 % (ref 37.5–51)
HCT VFR BLD AUTO: 26.4 % (ref 37.5–51)
HCT VFR BLD AUTO: 26.4 % (ref 37.5–51)
HCV RNA SERPL NAA+PROBE-ACNC: NORMAL IU/ML
HGB BLD-MCNC: 6.3 G/DL (ref 13–17.7)
HGB BLD-MCNC: 6.9 G/DL (ref 13–17.7)
HGB BLD-MCNC: 7.7 G/DL (ref 13–17.7)
HGB BLD-MCNC: 8.2 G/DL (ref 13–17.7)
HGB BLD-MCNC: 8.3 G/DL (ref 13–17.7)
HGB BLD-MCNC: 8.3 G/DL (ref 13–17.7)
HGB BLD-MCNC: 8.4 G/DL (ref 13–17.7)
HGB BLD-MCNC: 8.5 G/DL (ref 13–17.7)
HGB BLD-MCNC: 8.6 G/DL (ref 13–17.7)
HGB BLD-MCNC: 8.7 G/DL (ref 13–17.7)
HGB BLD-MCNC: 8.9 G/DL (ref 13–17.7)
HGB BLD-MCNC: 8.9 G/DL (ref 13–17.7)
HGB BLD-MCNC: 9 G/DL (ref 13–17.7)
HGB BLD-MCNC: 9.2 G/DL (ref 13–17.7)
HGB BLD-MCNC: 9.5 G/DL (ref 13–17.7)
HMPV RNA NPH QL NAA+NON-PROBE: NOT DETECTED
HPIV1 RNA SPEC QL NAA+PROBE: NOT DETECTED
HPIV2 RNA SPEC QL NAA+PROBE: NOT DETECTED
HPIV3 RNA NPH QL NAA+PROBE: NOT DETECTED
HPIV4 P GENE NPH QL NAA+PROBE: NOT DETECTED
IMM GRANULOCYTES # BLD AUTO: 0.08 10*3/MM3 (ref 0–0.05)
IMM GRANULOCYTES # BLD AUTO: 0.11 10*3/MM3 (ref 0–0.05)
IMM GRANULOCYTES # BLD AUTO: 0.12 10*3/MM3 (ref 0–0.05)
IMM GRANULOCYTES # BLD AUTO: 0.12 10*3/MM3 (ref 0–0.05)
IMM GRANULOCYTES # BLD AUTO: 0.19 10*3/MM3 (ref 0–0.05)
IMM GRANULOCYTES # BLD AUTO: 0.21 10*3/MM3 (ref 0–0.05)
IMM GRANULOCYTES # BLD AUTO: 0.21 10*3/MM3 (ref 0–0.05)
IMM GRANULOCYTES # BLD AUTO: 0.24 10*3/MM3 (ref 0–0.05)
IMM GRANULOCYTES NFR BLD AUTO: 0.6 % (ref 0–0.5)
IMM GRANULOCYTES NFR BLD AUTO: 0.7 % (ref 0–0.5)
IMM GRANULOCYTES NFR BLD AUTO: 0.7 % (ref 0–0.5)
IMM GRANULOCYTES NFR BLD AUTO: 0.9 % (ref 0–0.5)
IMM GRANULOCYTES NFR BLD AUTO: 1.1 % (ref 0–0.5)
IMM GRANULOCYTES NFR BLD AUTO: 1.1 % (ref 0–0.5)
IMM GRANULOCYTES NFR BLD AUTO: 1.2 % (ref 0–0.5)
IMM GRANULOCYTES NFR BLD AUTO: 1.5 % (ref 0–0.5)
INHALED O2 CONCENTRATION: 100 %
INHALED O2 CONCENTRATION: 21 %
INHALED O2 CONCENTRATION: 25 %
INHALED O2 CONCENTRATION: 30 %
INHALED O2 CONCENTRATION: 35 %
INHALED O2 CONCENTRATION: 40 %
INHALED O2 CONCENTRATION: 40 %
INR PPP: 1.56 (ref 0.9–1.1)
INR PPP: 1.58 (ref 0.9–1.1)
INR PPP: 1.83 (ref 0.9–1.1)
INR PPP: 1.88 (ref 0.9–1.1)
INR PPP: 1.89 (ref 0.9–1.1)
INR PPP: 1.92 (ref 0.9–1.1)
INR PPP: 1.94 (ref 0.9–1.1)
INR PPP: 1.95 (ref 0.9–1.1)
INR PPP: 2.13 (ref 0.9–1.1)
INR PPP: 2.14 (ref 0.9–1.1)
INR PPP: 2.16 (ref 0.9–1.1)
INR PPP: 2.31 (ref 0.9–1.1)
INR PPP: 2.41 (ref 0.9–1.1)
LACTATE HOLD SPECIMEN: NORMAL
LYMPHOCYTES # BLD AUTO: 0.86 10*3/MM3 (ref 0.7–3.1)
LYMPHOCYTES # BLD AUTO: 1.43 10*3/MM3 (ref 0.7–3.1)
LYMPHOCYTES # BLD AUTO: 2.46 10*3/MM3 (ref 0.7–3.1)
LYMPHOCYTES # BLD AUTO: 2.54 10*3/MM3 (ref 0.7–3.1)
LYMPHOCYTES # BLD AUTO: 2.88 10*3/MM3 (ref 0.7–3.1)
LYMPHOCYTES # BLD AUTO: 2.93 10*3/MM3 (ref 0.7–3.1)
LYMPHOCYTES # BLD AUTO: 2.99 10*3/MM3 (ref 0.7–3.1)
LYMPHOCYTES # BLD AUTO: 3.2 10*3/MM3 (ref 0.7–3.1)
LYMPHOCYTES # BLD AUTO: 3.27 10*3/MM3 (ref 0.7–3.1)
LYMPHOCYTES # BLD AUTO: 3.58 10*3/MM3 (ref 0.7–3.1)
LYMPHOCYTES # BLD MANUAL: 0.6 10*3/MM3 (ref 0.7–3.1)
LYMPHOCYTES NFR BLD AUTO: 10.9 % (ref 19.6–45.3)
LYMPHOCYTES NFR BLD AUTO: 10.9 % (ref 19.6–45.3)
LYMPHOCYTES NFR BLD AUTO: 15.2 % (ref 19.6–45.3)
LYMPHOCYTES NFR BLD AUTO: 16.2 % (ref 19.6–45.3)
LYMPHOCYTES NFR BLD AUTO: 16.4 % (ref 19.6–45.3)
LYMPHOCYTES NFR BLD AUTO: 17.4 % (ref 19.6–45.3)
LYMPHOCYTES NFR BLD AUTO: 18.1 % (ref 19.6–45.3)
LYMPHOCYTES NFR BLD AUTO: 19.3 % (ref 19.6–45.3)
LYMPHOCYTES NFR BLD AUTO: 36.3 % (ref 19.6–45.3)
LYMPHOCYTES NFR BLD AUTO: 7.4 % (ref 19.6–45.3)
LYMPHOCYTES NFR BLD MANUAL: 3 % (ref 19.6–45.3)
LYMPHOCYTES NFR FLD MANUAL: 16 %
LYMPHOCYTES NFR FLD MANUAL: 54 %
M PNEUMO IGG SER IA-ACNC: NOT DETECTED
MACROCYTES BLD QL SMEAR: ABNORMAL
MAGNESIUM SERPL-MCNC: 1.5 MG/DL (ref 1.6–2.4)
MAGNESIUM SERPL-MCNC: 1.7 MG/DL (ref 1.6–2.4)
MAGNESIUM SERPL-MCNC: 2.2 MG/DL (ref 1.6–2.4)
MAGNESIUM SERPL-MCNC: 2.3 MG/DL (ref 1.6–2.4)
MAGNESIUM SERPL-MCNC: 2.3 MG/DL (ref 1.6–2.4)
MAGNESIUM SERPL-MCNC: 2.4 MG/DL (ref 1.6–2.4)
MAGNESIUM SERPL-MCNC: 2.5 MG/DL (ref 1.6–2.4)
MAGNESIUM SERPL-MCNC: 2.5 MG/DL (ref 1.6–2.4)
MAGNESIUM SERPL-MCNC: 2.6 MG/DL (ref 1.6–2.4)
MAGNESIUM SERPL-MCNC: 2.6 MG/DL (ref 1.6–2.4)
MAGNESIUM SERPL-MCNC: 2.7 MG/DL (ref 1.6–2.4)
MCH RBC QN AUTO: 30.7 PG (ref 26.6–33)
MCH RBC QN AUTO: 30.9 PG (ref 26.6–33)
MCH RBC QN AUTO: 31.1 PG (ref 26.6–33)
MCH RBC QN AUTO: 31.1 PG (ref 26.6–33)
MCH RBC QN AUTO: 31.3 PG (ref 26.6–33)
MCH RBC QN AUTO: 31.3 PG (ref 26.6–33)
MCH RBC QN AUTO: 31.4 PG (ref 26.6–33)
MCH RBC QN AUTO: 31.4 PG (ref 26.6–33)
MCH RBC QN AUTO: 31.5 PG (ref 26.6–33)
MCH RBC QN AUTO: 31.7 PG (ref 26.6–33)
MCH RBC QN AUTO: 31.8 PG (ref 26.6–33)
MCH RBC QN AUTO: 31.8 PG (ref 26.6–33)
MCH RBC QN AUTO: 31.9 PG (ref 26.6–33)
MCH RBC QN AUTO: 32.1 PG (ref 26.6–33)
MCH RBC QN AUTO: 32.1 PG (ref 26.6–33)
MCH RBC QN AUTO: 32.2 PG (ref 26.6–33)
MCH RBC QN AUTO: 32.4 PG (ref 26.6–33)
MCH RBC QN AUTO: 32.6 PG (ref 26.6–33)
MCH RBC QN AUTO: 32.7 PG (ref 26.6–33)
MCH RBC QN AUTO: 32.7 PG (ref 26.6–33)
MCH RBC QN AUTO: 32.8 PG (ref 26.6–33)
MCH RBC QN AUTO: 32.9 PG (ref 26.6–33)
MCH RBC QN AUTO: 33 PG (ref 26.6–33)
MCH RBC QN AUTO: 33.5 PG (ref 26.6–33)
MCH RBC QN AUTO: 33.5 PG (ref 26.6–33)
MCH RBC QN AUTO: 39.5 PG (ref 26.6–33)
MCHC RBC AUTO-ENTMCNC: 29.8 G/DL (ref 31.5–35.7)
MCHC RBC AUTO-ENTMCNC: 33.2 G/DL (ref 31.5–35.7)
MCHC RBC AUTO-ENTMCNC: 33.9 G/DL (ref 31.5–35.7)
MCHC RBC AUTO-ENTMCNC: 34 G/DL (ref 31.5–35.7)
MCHC RBC AUTO-ENTMCNC: 34.3 G/DL (ref 31.5–35.7)
MCHC RBC AUTO-ENTMCNC: 34.3 G/DL (ref 31.5–35.7)
MCHC RBC AUTO-ENTMCNC: 34.4 G/DL (ref 31.5–35.7)
MCHC RBC AUTO-ENTMCNC: 34.6 G/DL (ref 31.5–35.7)
MCHC RBC AUTO-ENTMCNC: 34.7 G/DL (ref 31.5–35.7)
MCHC RBC AUTO-ENTMCNC: 34.8 G/DL (ref 31.5–35.7)
MCHC RBC AUTO-ENTMCNC: 34.9 G/DL (ref 31.5–35.7)
MCHC RBC AUTO-ENTMCNC: 35 G/DL (ref 31.5–35.7)
MCHC RBC AUTO-ENTMCNC: 35.2 G/DL (ref 31.5–35.7)
MCHC RBC AUTO-ENTMCNC: 35.3 G/DL (ref 31.5–35.7)
MCHC RBC AUTO-ENTMCNC: 35.3 G/DL (ref 31.5–35.7)
MCHC RBC AUTO-ENTMCNC: 35.5 G/DL (ref 31.5–35.7)
MCHC RBC AUTO-ENTMCNC: 35.6 G/DL (ref 31.5–35.7)
MCHC RBC AUTO-ENTMCNC: 35.8 G/DL (ref 31.5–35.7)
MCHC RBC AUTO-ENTMCNC: 35.8 G/DL (ref 31.5–35.7)
MCHC RBC AUTO-ENTMCNC: 36 G/DL (ref 31.5–35.7)
MCHC RBC AUTO-ENTMCNC: 36.2 G/DL (ref 31.5–35.7)
MCHC RBC AUTO-ENTMCNC: 36.5 G/DL (ref 31.5–35.7)
MCHC RBC AUTO-ENTMCNC: 36.6 G/DL (ref 31.5–35.7)
MCV RBC AUTO: 132.3 FL (ref 79–97)
MCV RBC AUTO: 85.4 FL (ref 79–97)
MCV RBC AUTO: 86.4 FL (ref 79–97)
MCV RBC AUTO: 86.7 FL (ref 79–97)
MCV RBC AUTO: 87 FL (ref 79–97)
MCV RBC AUTO: 87.1 FL (ref 79–97)
MCV RBC AUTO: 88 FL (ref 79–97)
MCV RBC AUTO: 88 FL (ref 79–97)
MCV RBC AUTO: 88.4 FL (ref 79–97)
MCV RBC AUTO: 88.6 FL (ref 79–97)
MCV RBC AUTO: 89.4 FL (ref 79–97)
MCV RBC AUTO: 89.4 FL (ref 79–97)
MCV RBC AUTO: 89.8 FL (ref 79–97)
MCV RBC AUTO: 90.2 FL (ref 79–97)
MCV RBC AUTO: 90.2 FL (ref 79–97)
MCV RBC AUTO: 90.4 FL (ref 79–97)
MCV RBC AUTO: 90.4 FL (ref 79–97)
MCV RBC AUTO: 91 FL (ref 79–97)
MCV RBC AUTO: 91.3 FL (ref 79–97)
MCV RBC AUTO: 91.8 FL (ref 79–97)
MCV RBC AUTO: 92.3 FL (ref 79–97)
MCV RBC AUTO: 92.7 FL (ref 79–97)
MCV RBC AUTO: 92.8 FL (ref 79–97)
MCV RBC AUTO: 93.4 FL (ref 79–97)
MCV RBC AUTO: 93.4 FL (ref 79–97)
MCV RBC AUTO: 93.6 FL (ref 79–97)
MCV RBC AUTO: 94 FL (ref 79–97)
MCV RBC AUTO: 94.8 FL (ref 79–97)
MCV RBC AUTO: 95.1 FL (ref 79–97)
MCV RBC AUTO: 96.1 FL (ref 79–97)
MCV RBC AUTO: 96.5 FL (ref 79–97)
METHADONE UR QL SCN: NEGATIVE
METHOD: NORMAL
METHOD: NORMAL
MODALITY: ABNORMAL
MONOCYTES # BLD AUTO: 0.65 10*3/MM3 (ref 0.1–0.9)
MONOCYTES # BLD AUTO: 0.74 10*3/MM3 (ref 0.1–0.9)
MONOCYTES # BLD AUTO: 1.58 10*3/MM3 (ref 0.1–0.9)
MONOCYTES # BLD AUTO: 1.64 10*3/MM3 (ref 0.1–0.9)
MONOCYTES # BLD AUTO: 1.68 10*3/MM3 (ref 0.1–0.9)
MONOCYTES # BLD AUTO: 2 10*3/MM3 (ref 0.1–0.9)
MONOCYTES # BLD AUTO: 2.11 10*3/MM3 (ref 0.1–0.9)
MONOCYTES # BLD AUTO: 2.12 10*3/MM3 (ref 0.1–0.9)
MONOCYTES # BLD AUTO: 2.2 10*3/MM3 (ref 0.1–0.9)
MONOCYTES # BLD AUTO: 2.54 10*3/MM3 (ref 0.1–0.9)
MONOCYTES NFR BLD AUTO: 10.9 % (ref 5–12)
MONOCYTES NFR BLD AUTO: 11.8 % (ref 5–12)
MONOCYTES NFR BLD AUTO: 12 % (ref 5–12)
MONOCYTES NFR BLD AUTO: 12.3 % (ref 5–12)
MONOCYTES NFR BLD AUTO: 15.1 % (ref 5–12)
MONOCYTES NFR BLD AUTO: 7 % (ref 5–12)
MONOCYTES NFR BLD AUTO: 8.2 % (ref 5–12)
MONOCYTES NFR BLD AUTO: 8.4 % (ref 5–12)
MONOCYTES NFR BLD AUTO: 8.9 % (ref 5–12)
MONOCYTES NFR BLD AUTO: 9.2 % (ref 5–12)
MONOCYTES NFR FLD: 22 %
MONOCYTES NFR FLD: 25 %
MONOS+MACROS NFR FLD: 1 %
MONOS+MACROS NFR FLD: 10 %
MRSA DNA SPEC QL NAA+PROBE: ABNORMAL
NEUTROPHILS # BLD AUTO: 19.32 10*3/MM3 (ref 1.7–7)
NEUTROPHILS NFR BLD AUTO: 11.39 10*3/MM3 (ref 1.7–7)
NEUTROPHILS NFR BLD AUTO: 12.87 10*3/MM3 (ref 1.7–7)
NEUTROPHILS NFR BLD AUTO: 13.67 10*3/MM3 (ref 1.7–7)
NEUTROPHILS NFR BLD AUTO: 14.09 10*3/MM3 (ref 1.7–7)
NEUTROPHILS NFR BLD AUTO: 14.1 10*3/MM3 (ref 1.7–7)
NEUTROPHILS NFR BLD AUTO: 15.55 10*3/MM3 (ref 1.7–7)
NEUTROPHILS NFR BLD AUTO: 17.79 10*3/MM3 (ref 1.7–7)
NEUTROPHILS NFR BLD AUTO: 4.03 10*3/MM3 (ref 1.7–7)
NEUTROPHILS NFR BLD AUTO: 49.7 % (ref 42.7–76)
NEUTROPHILS NFR BLD AUTO: 6.2 10*3/MM3 (ref 1.7–7)
NEUTROPHILS NFR BLD AUTO: 64.6 % (ref 42.7–76)
NEUTROPHILS NFR BLD AUTO: 67.3 % (ref 42.7–76)
NEUTROPHILS NFR BLD AUTO: 68.5 % (ref 42.7–76)
NEUTROPHILS NFR BLD AUTO: 69.8 % (ref 42.7–76)
NEUTROPHILS NFR BLD AUTO: 72 % (ref 42.7–76)
NEUTROPHILS NFR BLD AUTO: 72.3 % (ref 42.7–76)
NEUTROPHILS NFR BLD AUTO: 78.8 % (ref 42.7–76)
NEUTROPHILS NFR BLD AUTO: 79.1 % (ref 42.7–76)
NEUTROPHILS NFR BLD AUTO: 80.1 % (ref 42.7–76)
NEUTROPHILS NFR BLD AUTO: 9.04 10*3/MM3 (ref 1.7–7)
NEUTROPHILS NFR BLD MANUAL: 97 % (ref 42.7–76)
NEUTROPHILS NFR FLD MANUAL: 20 %
NEUTROPHILS NFR FLD MANUAL: 52 %
NRBC BLD AUTO-RTO: 0 /100 WBC (ref 0–0.2)
NRBC BLD AUTO-RTO: 0 /100 WBC (ref 0–0.2)
NRBC BLD AUTO-RTO: 0.1 /100 WBC (ref 0–0.2)
NRBC BLD AUTO-RTO: 0.2 /100 WBC (ref 0–0.2)
NRBC BLD AUTO-RTO: 0.2 /100 WBC (ref 0–0.2)
NUC CELL # FLD: 14 /MM3
NUC CELL # FLD: 37 /MM3
O2 A-A PPRESDIFF RESPIRATORY: 0.3 MMHG
O2 A-A PPRESDIFF RESPIRATORY: 0.4 MMHG
O2 A-A PPRESDIFF RESPIRATORY: 0.5 MMHG
O2 A-A PPRESDIFF RESPIRATORY: 0.6 MMHG
OPIATES UR QL: POSITIVE
OXYCODONE UR QL SCN: POSITIVE
PCO2 BLDA: 26.1 MM HG (ref 35–45)
PCO2 BLDA: 27.5 MM HG (ref 35–45)
PCO2 BLDA: 30.5 MM HG (ref 35–45)
PCO2 BLDA: 33.5 MM HG (ref 35–45)
PCO2 BLDA: 34.4 MM HG (ref 35–45)
PCO2 BLDA: 35.2 MM HG (ref 35–45)
PCO2 BLDA: 35.4 MM HG (ref 35–45)
PCO2 BLDA: 36 MM HG (ref 35–45)
PCO2 BLDA: 36.1 MM HG (ref 35–45)
PCO2 BLDA: 36.1 MM HG (ref 35–45)
PCO2 BLDA: 36.2 MM HG (ref 35–45)
PCO2 BLDA: 36.5 MM HG (ref 35–45)
PCO2 BLDA: 38.2 MM HG (ref 35–45)
PCO2 BLDA: 38.9 MM HG (ref 35–45)
PCO2 BLDA: 53.2 MM HG (ref 35–45)
PEEP RESPIRATORY: 5 CM[H2O]
PH BLDA: 7.16 PH UNITS (ref 7.35–7.45)
PH BLDA: 7.33 PH UNITS (ref 7.35–7.45)
PH BLDA: 7.46 PH UNITS (ref 7.35–7.45)
PH BLDA: 7.48 PH UNITS (ref 7.35–7.45)
PH BLDA: 7.51 PH UNITS (ref 7.35–7.45)
PH BLDA: 7.52 PH UNITS (ref 7.35–7.45)
PH BLDA: 7.52 PH UNITS (ref 7.35–7.45)
PH BLDA: 7.53 PH UNITS (ref 7.35–7.45)
PH BLDA: 7.53 PH UNITS (ref 7.35–7.45)
PH BLDA: 7.54 PH UNITS (ref 7.35–7.45)
PH BLDA: 7.55 PH UNITS (ref 7.35–7.45)
PH BLDA: 7.56 PH UNITS (ref 7.35–7.45)
PH BLDA: 7.63 PH UNITS (ref 7.35–7.45)
PHOSPHATE SERPL-MCNC: 2 MG/DL (ref 2.5–4.5)
PHOSPHATE SERPL-MCNC: 2 MG/DL (ref 2.5–4.5)
PHOSPHATE SERPL-MCNC: 2.4 MG/DL (ref 2.5–4.5)
PHOSPHATE SERPL-MCNC: 2.6 MG/DL (ref 2.5–4.5)
PHOSPHATE SERPL-MCNC: 2.7 MG/DL (ref 2.5–4.5)
PHOSPHATE SERPL-MCNC: 2.7 MG/DL (ref 2.5–4.5)
PHOSPHATE SERPL-MCNC: 2.8 MG/DL (ref 2.5–4.5)
PHOSPHATE SERPL-MCNC: 2.9 MG/DL (ref 2.5–4.5)
PHOSPHATE SERPL-MCNC: 3 MG/DL (ref 2.5–4.5)
PHOSPHATE SERPL-MCNC: 3.4 MG/DL (ref 2.5–4.5)
PHOSPHATE SERPL-MCNC: 3.6 MG/DL (ref 2.5–4.5)
PHOSPHATE SERPL-MCNC: 3.7 MG/DL (ref 2.5–4.5)
PHOSPHATE SERPL-MCNC: 4 MG/DL (ref 2.5–4.5)
PLAT MORPH BLD: NORMAL
PLATELET # BLD AUTO: 102 10*3/MM3 (ref 140–450)
PLATELET # BLD AUTO: 103 10*3/MM3 (ref 140–450)
PLATELET # BLD AUTO: 105 10*3/MM3 (ref 140–450)
PLATELET # BLD AUTO: 106 10*3/MM3 (ref 140–450)
PLATELET # BLD AUTO: 107 10*3/MM3 (ref 140–450)
PLATELET # BLD AUTO: 108 10*3/MM3 (ref 140–450)
PLATELET # BLD AUTO: 109 10*3/MM3 (ref 140–450)
PLATELET # BLD AUTO: 110 10*3/MM3 (ref 140–450)
PLATELET # BLD AUTO: 111 10*3/MM3 (ref 140–450)
PLATELET # BLD AUTO: 112 10*3/MM3 (ref 140–450)
PLATELET # BLD AUTO: 115 10*3/MM3 (ref 140–450)
PLATELET # BLD AUTO: 116 10*3/MM3 (ref 140–450)
PLATELET # BLD AUTO: 116 10*3/MM3 (ref 140–450)
PLATELET # BLD AUTO: 117 10*3/MM3 (ref 140–450)
PLATELET # BLD AUTO: 119 10*3/MM3 (ref 140–450)
PLATELET # BLD AUTO: 119 10*3/MM3 (ref 140–450)
PLATELET # BLD AUTO: 121 10*3/MM3 (ref 140–450)
PLATELET # BLD AUTO: 123 10*3/MM3 (ref 140–450)
PLATELET # BLD AUTO: 127 10*3/MM3 (ref 140–450)
PLATELET # BLD AUTO: 127 10*3/MM3 (ref 140–450)
PLATELET # BLD AUTO: 128 10*3/MM3 (ref 140–450)
PLATELET # BLD AUTO: 141 10*3/MM3 (ref 140–450)
PLATELET # BLD AUTO: 50 10*3/MM3 (ref 140–450)
PLATELET # BLD AUTO: 66 10*3/MM3 (ref 140–450)
PLATELET # BLD AUTO: 68 10*3/MM3 (ref 140–450)
PLATELET # BLD AUTO: 81 10*3/MM3 (ref 140–450)
PLATELET # BLD AUTO: 92 10*3/MM3 (ref 140–450)
PMV BLD AUTO: 10.1 FL (ref 6–12)
PMV BLD AUTO: 10.2 FL (ref 6–12)
PMV BLD AUTO: 10.2 FL (ref 6–12)
PMV BLD AUTO: 10.3 FL (ref 6–12)
PMV BLD AUTO: 10.4 FL (ref 6–12)
PMV BLD AUTO: 10.4 FL (ref 6–12)
PMV BLD AUTO: 10.5 FL (ref 6–12)
PMV BLD AUTO: 10.5 FL (ref 6–12)
PMV BLD AUTO: 10.6 FL (ref 6–12)
PMV BLD AUTO: 10.6 FL (ref 6–12)
PMV BLD AUTO: 10.7 FL (ref 6–12)
PMV BLD AUTO: 11.2 FL (ref 6–12)
PMV BLD AUTO: 11.5 FL (ref 6–12)
PMV BLD AUTO: 14 FL (ref 6–12)
PMV BLD AUTO: 9.6 FL (ref 6–12)
PMV BLD AUTO: 9.7 FL (ref 6–12)
PMV BLD AUTO: 9.8 FL (ref 6–12)
PMV BLD AUTO: 9.9 FL (ref 6–12)
PO2 BLDA: 394.4 MM HG (ref 80–100)
PO2 BLDA: 48.8 MM HG (ref 80–100)
PO2 BLDA: 51 MM HG (ref 80–100)
PO2 BLDA: 52.7 MM HG (ref 80–100)
PO2 BLDA: 53 MM HG (ref 80–100)
PO2 BLDA: 59.4 MM HG (ref 80–100)
PO2 BLDA: 59.7 MM HG (ref 80–100)
PO2 BLDA: 61.4 MM HG (ref 80–100)
PO2 BLDA: 63.7 MM HG (ref 80–100)
PO2 BLDA: 65.4 MM HG (ref 80–100)
PO2 BLDA: 66.8 MM HG (ref 80–100)
PO2 BLDA: 72 MM HG (ref 80–100)
PO2 BLDA: 76.1 MM HG (ref 80–100)
PO2 BLDA: 77.4 MM HG (ref 80–100)
PO2 BLDA: 77.7 MM HG (ref 80–100)
POTASSIUM SERPL-SCNC: 3.2 MMOL/L (ref 3.5–5.2)
POTASSIUM SERPL-SCNC: 3.3 MMOL/L (ref 3.5–5.2)
POTASSIUM SERPL-SCNC: 3.4 MMOL/L (ref 3.5–5.2)
POTASSIUM SERPL-SCNC: 3.5 MMOL/L (ref 3.5–5.2)
POTASSIUM SERPL-SCNC: 3.6 MMOL/L (ref 3.5–5.2)
POTASSIUM SERPL-SCNC: 3.7 MMOL/L (ref 3.5–5.2)
POTASSIUM SERPL-SCNC: 3.7 MMOL/L (ref 3.5–5.2)
POTASSIUM SERPL-SCNC: 3.8 MMOL/L (ref 3.5–5.2)
POTASSIUM SERPL-SCNC: 3.9 MMOL/L (ref 3.5–5.2)
POTASSIUM SERPL-SCNC: 3.9 MMOL/L (ref 3.5–5.2)
POTASSIUM SERPL-SCNC: 4.1 MMOL/L (ref 3.5–5.2)
POTASSIUM SERPL-SCNC: 4.3 MMOL/L (ref 3.5–5.2)
POTASSIUM SERPL-SCNC: 4.4 MMOL/L (ref 3.5–5.2)
POTASSIUM SERPL-SCNC: 4.4 MMOL/L (ref 3.5–5.2)
POTASSIUM SERPL-SCNC: 4.5 MMOL/L (ref 3.5–5.2)
POTASSIUM SERPL-SCNC: 5.5 MMOL/L (ref 3.5–5.2)
PROCALCITONIN SERPL-MCNC: 0.23 NG/ML (ref 0–0.25)
PROT FLD-MCNC: <1 G/DL
PROT SERPL-MCNC: 4 G/DL (ref 6–8.5)
PROT SERPL-MCNC: 4.1 G/DL (ref 6–8.5)
PROT SERPL-MCNC: 4.5 G/DL (ref 6–8.5)
PROT SERPL-MCNC: 4.9 G/DL (ref 6–8.5)
PROT SERPL-MCNC: 5.2 G/DL (ref 6–8.5)
PROT SERPL-MCNC: 5.4 G/DL (ref 6–8.5)
PROT SERPL-MCNC: 5.5 G/DL (ref 6–8.5)
PROT SERPL-MCNC: 5.6 G/DL (ref 6–8.5)
PROT SERPL-MCNC: 5.6 G/DL (ref 6–8.5)
PROTHROMBIN TIME: 18.3 SECONDS (ref 11.7–14.2)
PROTHROMBIN TIME: 18.5 SECONDS (ref 11.7–14.2)
PROTHROMBIN TIME: 20.7 SECONDS (ref 11.7–14.2)
PROTHROMBIN TIME: 21.2 SECONDS (ref 11.7–14.2)
PROTHROMBIN TIME: 21.3 SECONDS (ref 11.7–14.2)
PROTHROMBIN TIME: 21.5 SECONDS (ref 11.7–14.2)
PROTHROMBIN TIME: 21.6 SECONDS (ref 11.7–14.2)
PROTHROMBIN TIME: 21.6 SECONDS (ref 11.7–14.2)
PROTHROMBIN TIME: 21.7 SECONDS (ref 11.7–14.2)
PROTHROMBIN TIME: 21.7 SECONDS (ref 11.7–14.2)
PROTHROMBIN TIME: 23.2 SECONDS (ref 11.7–14.2)
PROTHROMBIN TIME: 23.3 SECONDS (ref 11.7–14.2)
PROTHROMBIN TIME: 23.5 SECONDS (ref 11.7–14.2)
PROTHROMBIN TIME: 24.7 SECONDS (ref 11.7–14.2)
PROTHROMBIN TIME: 25.5 SECONDS (ref 11.7–14.2)
PSV: 10 CMH2O
PSV: 8 CMH2O
RBC # BLD AUTO: 1.91 10*6/MM3 (ref 4.14–5.8)
RBC # BLD AUTO: 1.95 10*6/MM3 (ref 4.14–5.8)
RBC # BLD AUTO: 2.06 10*6/MM3 (ref 4.14–5.8)
RBC # BLD AUTO: 2.5 10*6/MM3 (ref 4.14–5.8)
RBC # BLD AUTO: 2.55 10*6/MM3 (ref 4.14–5.8)
RBC # BLD AUTO: 2.59 10*6/MM3 (ref 4.14–5.8)
RBC # BLD AUTO: 2.6 10*6/MM3 (ref 4.14–5.8)
RBC # BLD AUTO: 2.63 10*6/MM3 (ref 4.14–5.8)
RBC # BLD AUTO: 2.64 10*6/MM3 (ref 4.14–5.8)
RBC # BLD AUTO: 2.64 10*6/MM3 (ref 4.14–5.8)
RBC # BLD AUTO: 2.65 10*6/MM3 (ref 4.14–5.8)
RBC # BLD AUTO: 2.65 10*6/MM3 (ref 4.14–5.8)
RBC # BLD AUTO: 2.66 10*6/MM3 (ref 4.14–5.8)
RBC # BLD AUTO: 2.67 10*6/MM3 (ref 4.14–5.8)
RBC # BLD AUTO: 2.68 10*6/MM3 (ref 4.14–5.8)
RBC # BLD AUTO: 2.7 10*6/MM3 (ref 4.14–5.8)
RBC # BLD AUTO: 2.71 10*6/MM3 (ref 4.14–5.8)
RBC # BLD AUTO: 2.71 10*6/MM3 (ref 4.14–5.8)
RBC # BLD AUTO: 2.74 10*6/MM3 (ref 4.14–5.8)
RBC # BLD AUTO: 2.76 10*6/MM3 (ref 4.14–5.8)
RBC # BLD AUTO: 2.76 10*6/MM3 (ref 4.14–5.8)
RBC # BLD AUTO: 2.77 10*6/MM3 (ref 4.14–5.8)
RBC # BLD AUTO: 2.8 10*6/MM3 (ref 4.14–5.8)
RBC # BLD AUTO: 2.83 10*6/MM3 (ref 4.14–5.8)
RBC # BLD AUTO: 2.88 10*6/MM3 (ref 4.14–5.8)
RBC # BLD AUTO: 2.98 10*6/MM3 (ref 4.14–5.8)
RBC # BLD AUTO: 3 10*6/MM3 (ref 4.14–5.8)
RBC # FLD AUTO: 30 /MM3
RBC # FLD AUTO: 326 /MM3
RH BLD: POSITIVE
RHINOVIRUS RNA SPEC NAA+PROBE: NOT DETECTED
RSV RNA NPH QL NAA+NON-PROBE: NOT DETECTED
SAO2 % BLDCOA: 89.4 % (ref 92–99)
SAO2 % BLDCOA: 90.1 % (ref 92–99)
SAO2 % BLDCOA: 90.4 % (ref 92–99)
SAO2 % BLDCOA: 92.9 % (ref 92–99)
SAO2 % BLDCOA: 93.2 % (ref 92–99)
SAO2 % BLDCOA: 93.2 % (ref 92–99)
SAO2 % BLDCOA: 93.5 % (ref 92–99)
SAO2 % BLDCOA: 94.2 % (ref 92–99)
SAO2 % BLDCOA: 94.4 % (ref 92–99)
SAO2 % BLDCOA: 94.6 % (ref 92–99)
SAO2 % BLDCOA: 95.2 % (ref 92–99)
SAO2 % BLDCOA: 95.7 % (ref 92–99)
SAO2 % BLDCOA: 96.6 % (ref 92–99)
SAO2 % BLDCOA: 96.8 % (ref 92–99)
SAO2 % BLDCOA: 99.9 % (ref 92–99)
SARS-COV-2 RNA NPH QL NAA+NON-PROBE: NOT DETECTED
SET MECH RESP RATE: 15
SET MECH RESP RATE: 20
SODIUM SERPL-SCNC: 133 MMOL/L (ref 136–145)
SODIUM SERPL-SCNC: 134 MMOL/L (ref 136–145)
SODIUM SERPL-SCNC: 139 MMOL/L (ref 136–145)
SODIUM SERPL-SCNC: 140 MMOL/L (ref 136–145)
SODIUM SERPL-SCNC: 141 MMOL/L (ref 136–145)
SODIUM SERPL-SCNC: 142 MMOL/L (ref 136–145)
SODIUM SERPL-SCNC: 142 MMOL/L (ref 136–145)
SODIUM SERPL-SCNC: 143 MMOL/L (ref 136–145)
SODIUM SERPL-SCNC: 143 MMOL/L (ref 136–145)
SODIUM SERPL-SCNC: 144 MMOL/L (ref 136–145)
SODIUM SERPL-SCNC: 144 MMOL/L (ref 136–145)
SODIUM SERPL-SCNC: 147 MMOL/L (ref 136–145)
SODIUM SERPL-SCNC: 148 MMOL/L (ref 136–145)
SODIUM SERPL-SCNC: 150 MMOL/L (ref 136–145)
SODIUM SERPL-SCNC: 151 MMOL/L (ref 136–145)
SODIUM SERPL-SCNC: 153 MMOL/L (ref 136–145)
SODIUM SERPL-SCNC: 154 MMOL/L (ref 136–145)
SPHEROCYTES BLD QL SMEAR: ABNORMAL
T&S EXPIRATION DATE: NORMAL
TEST INFORMATION: NORMAL
TOTAL RATE: 16 BREATHS/MINUTE
TOTAL RATE: 18 BREATHS/MINUTE
TOTAL RATE: 19 BREATHS/MINUTE
TOTAL RATE: 20 BREATHS/MINUTE
TOTAL RATE: 20 BREATHS/MINUTE
TOTAL RATE: 21 BREATHS/MINUTE
TOTAL RATE: 23 BREATHS/MINUTE
TOTAL RATE: 25 BREATHS/MINUTE
TOTAL RATE: 26 BREATHS/MINUTE
TOTAL RATE: 27 BREATHS/MINUTE
TOTAL RATE: 27 BREATHS/MINUTE
TOTAL RATE: 31 BREATHS/MINUTE
TOTAL RATE: 32 BREATHS/MINUTE
TRIGL SERPL-MCNC: 119 MG/DL (ref 0–150)
TRIGL SERPL-MCNC: 76 MG/DL (ref 0–150)
TROPONIN T SERPL-MCNC: 0.03 NG/ML (ref 0–0.03)
UNIT  ABO: NORMAL
UNIT  RH: NORMAL
VANCOMYCIN TROUGH SERPL-MCNC: 7.8 MCG/ML (ref 5–20)
VENTILATOR MODE: ABNORMAL
VT ON VENT VENT: 500 ML
VT ON VENT VENT: 539 ML
VT ON VENT VENT: 555 ML
VT ON VENT VENT: 583 ML
VT ON VENT VENT: 600 ML
VT ON VENT VENT: 602 ML
VT ON VENT VENT: 605 ML
VT ON VENT VENT: 611 ML
VT ON VENT VENT: 632 ML
VT ON VENT VENT: 661 ML
VT ON VENT VENT: 677 ML
WBC # BLD AUTO: 13.67 10*3/MM3 (ref 3.4–10.8)
WBC # BLD AUTO: 14 10*3/MM3 (ref 3.4–10.8)
WBC # BLD AUTO: 14.12 10*3/MM3 (ref 3.4–10.8)
WBC # BLD AUTO: 14.19 10*3/MM3 (ref 3.4–10.8)
WBC # BLD AUTO: 14.2 10*3/MM3 (ref 3.4–10.8)
WBC # BLD AUTO: 14.52 10*3/MM3 (ref 3.4–10.8)
WBC # BLD AUTO: 15.3 10*3/MM3 (ref 3.4–10.8)
WBC # BLD AUTO: 15.58 10*3/MM3 (ref 3.4–10.8)
WBC # BLD AUTO: 15.64 10*3/MM3 (ref 3.4–10.8)
WBC # BLD AUTO: 16.3 10*3/MM3 (ref 3.4–10.8)
WBC # BLD AUTO: 16.4 10*3/MM3 (ref 3.4–10.8)
WBC # BLD AUTO: 16.53 10*3/MM3 (ref 3.4–10.8)
WBC # BLD AUTO: 16.92 10*3/MM3 (ref 3.4–10.8)
WBC # BLD AUTO: 17.19 10*3/MM3 (ref 3.4–10.8)
WBC # BLD AUTO: 17.48 10*3/MM3 (ref 3.4–10.8)
WBC # BLD AUTO: 18.41 10*3/MM3 (ref 3.4–10.8)
WBC # BLD AUTO: 18.92 10*3/MM3 (ref 3.4–10.8)
WBC # BLD AUTO: 19.4 10*3/MM3 (ref 3.4–10.8)
WBC # BLD AUTO: 19.54 10*3/MM3 (ref 3.4–10.8)
WBC # BLD AUTO: 19.92 10*3/MM3 (ref 3.4–10.8)
WBC # BLD AUTO: 20.16 10*3/MM3 (ref 3.4–10.8)
WBC # BLD AUTO: 20.16 10*3/MM3 (ref 3.4–10.8)
WBC # BLD AUTO: 20.56 10*3/MM3 (ref 3.4–10.8)
WBC # BLD AUTO: 20.59 10*3/MM3 (ref 3.4–10.8)
WBC # BLD AUTO: 20.84 10*3/MM3 (ref 3.4–10.8)
WBC # BLD AUTO: 21.65 10*3/MM3 (ref 3.4–10.8)
WBC # BLD AUTO: 22.5 10*3/MM3 (ref 3.4–10.8)
WBC # BLD AUTO: 24.4 10*3/MM3 (ref 3.4–10.8)
WBC # BLD AUTO: 7.88 10*3/MM3 (ref 3.4–10.8)
WBC # BLD AUTO: 8.08 10*3/MM3 (ref 3.4–10.8)
WBC # BLD AUTO: 9.88 10*3/MM3 (ref 3.4–10.8)
WBC MORPH BLD: NORMAL

## 2020-01-01 PROCEDURE — P9035 PLATELET PHERES LEUKOREDUCED: HCPCS

## 2020-01-01 PROCEDURE — 25010000002 LORAZEPAM PER 2 MG

## 2020-01-01 PROCEDURE — 25010000002 PIPERACILLIN SOD-TAZOBACTAM PER 1 G: Performed by: INTERNAL MEDICINE

## 2020-01-01 PROCEDURE — 25010000002 FENTANYL CITRATE (PF) 100 MCG/2ML SOLUTION: Performed by: INTERNAL MEDICINE

## 2020-01-01 PROCEDURE — 83735 ASSAY OF MAGNESIUM: CPT | Performed by: INTERNAL MEDICINE

## 2020-01-01 PROCEDURE — 94799 UNLISTED PULMONARY SVC/PX: CPT

## 2020-01-01 PROCEDURE — 25010000002 OCTREOTIDE PER 25 MCG: Performed by: INTERNAL MEDICINE

## 2020-01-01 PROCEDURE — 85007 BL SMEAR W/DIFF WBC COUNT: CPT | Performed by: INTERNAL MEDICINE

## 2020-01-01 PROCEDURE — 25010000002 THIAMINE PER 100 MG: Performed by: INTERNAL MEDICINE

## 2020-01-01 PROCEDURE — 82962 GLUCOSE BLOOD TEST: CPT

## 2020-01-01 PROCEDURE — 25010000002 PROPOFOL 10 MG/ML EMULSION: Performed by: INTERNAL MEDICINE

## 2020-01-01 PROCEDURE — 63710000001 INSULIN LISPRO (HUMAN) PER 5 UNITS: Performed by: INTERNAL MEDICINE

## 2020-01-01 PROCEDURE — 36430 TRANSFUSION BLD/BLD COMPNT: CPT

## 2020-01-01 PROCEDURE — P9017 PLASMA 1 DONOR FRZ W/IN 8 HR: HCPCS

## 2020-01-01 PROCEDURE — 85027 COMPLETE CBC AUTOMATED: CPT | Performed by: INTERNAL MEDICINE

## 2020-01-01 PROCEDURE — 80053 COMPREHEN METABOLIC PANEL: CPT | Performed by: INTERNAL MEDICINE

## 2020-01-01 PROCEDURE — 89051 BODY FLUID CELL COUNT: CPT | Performed by: INTERNAL MEDICINE

## 2020-01-01 PROCEDURE — 86901 BLOOD TYPING SEROLOGIC RH(D): CPT

## 2020-01-01 PROCEDURE — 70450 CT HEAD/BRAIN W/O DYE: CPT

## 2020-01-01 PROCEDURE — 86923 COMPATIBILITY TEST ELECTRIC: CPT

## 2020-01-01 PROCEDURE — 84100 ASSAY OF PHOSPHORUS: CPT | Performed by: INTERNAL MEDICINE

## 2020-01-01 PROCEDURE — 25010000002 FUROSEMIDE PER 20 MG: Performed by: INTERNAL MEDICINE

## 2020-01-01 PROCEDURE — 86901 BLOOD TYPING SEROLOGIC RH(D): CPT | Performed by: EMERGENCY MEDICINE

## 2020-01-01 PROCEDURE — 99232 SBSQ HOSP IP/OBS MODERATE 35: CPT | Performed by: INTERNAL MEDICINE

## 2020-01-01 PROCEDURE — 63710000001 INSULIN REGULAR HUMAN PER 5 UNITS: Performed by: INTERNAL MEDICINE

## 2020-01-01 PROCEDURE — 87075 CULTR BACTERIA EXCEPT BLOOD: CPT | Performed by: INTERNAL MEDICINE

## 2020-01-01 PROCEDURE — 25010000002 LORAZEPAM PER 2 MG: Performed by: INTERNAL MEDICINE

## 2020-01-01 PROCEDURE — 85610 PROTHROMBIN TIME: CPT | Performed by: INTERNAL MEDICINE

## 2020-01-01 PROCEDURE — 25010000002 MORPHINE PER 10 MG

## 2020-01-01 PROCEDURE — 94003 VENT MGMT INPAT SUBQ DAY: CPT

## 2020-01-01 PROCEDURE — 82140 ASSAY OF AMMONIA: CPT | Performed by: INTERNAL MEDICINE

## 2020-01-01 PROCEDURE — 25010000003 POTASSIUM CHLORIDE PER 2 MEQ: Performed by: INTERNAL MEDICINE

## 2020-01-01 PROCEDURE — 87070 CULTURE OTHR SPECIMN AEROBIC: CPT | Performed by: INTERNAL MEDICINE

## 2020-01-01 PROCEDURE — 82803 BLOOD GASES ANY COMBINATION: CPT

## 2020-01-01 PROCEDURE — 86927 PLASMA FRESH FROZEN: CPT

## 2020-01-01 PROCEDURE — 25010000002 CALCIUM GLUCONATE PER 10 ML: Performed by: INTERNAL MEDICINE

## 2020-01-01 PROCEDURE — 83605 ASSAY OF LACTIC ACID: CPT | Performed by: INTERNAL MEDICINE

## 2020-01-01 PROCEDURE — 87205 SMEAR GRAM STAIN: CPT | Performed by: INTERNAL MEDICINE

## 2020-01-01 PROCEDURE — 94002 VENT MGMT INPAT INIT DAY: CPT

## 2020-01-01 PROCEDURE — 25010000002 MAGNESIUM SULFATE PER 500 MG OF MAGNESIUM: Performed by: INTERNAL MEDICINE

## 2020-01-01 PROCEDURE — 80307 DRUG TEST PRSMV CHEM ANLYZR: CPT | Performed by: INTERNAL MEDICINE

## 2020-01-01 PROCEDURE — 86705 HEP B CORE ANTIBODY IGM: CPT | Performed by: INTERNAL MEDICINE

## 2020-01-01 PROCEDURE — 0DJ08ZZ INSPECTION OF UPPER INTESTINAL TRACT, VIA NATURAL OR ARTIFICIAL OPENING ENDOSCOPIC: ICD-10-PCS | Performed by: INTERNAL MEDICINE

## 2020-01-01 PROCEDURE — 25010000002 MORPHINE PER 10 MG: Performed by: INTERNAL MEDICINE

## 2020-01-01 PROCEDURE — 80053 COMPREHEN METABOLIC PANEL: CPT | Performed by: EMERGENCY MEDICINE

## 2020-01-01 PROCEDURE — 71045 X-RAY EXAM CHEST 1 VIEW: CPT

## 2020-01-01 PROCEDURE — 84478 ASSAY OF TRIGLYCERIDES: CPT | Performed by: INTERNAL MEDICINE

## 2020-01-01 PROCEDURE — 85379 FIBRIN DEGRADATION QUANT: CPT | Performed by: INTERNAL MEDICINE

## 2020-01-01 PROCEDURE — B54BZZA ULTRASONOGRAPHY OF RIGHT LOWER EXTREMITY VEINS, GUIDANCE: ICD-10-PCS | Performed by: INTERNAL MEDICINE

## 2020-01-01 PROCEDURE — P9016 RBC LEUKOCYTES REDUCED: HCPCS

## 2020-01-01 PROCEDURE — 85384 FIBRINOGEN ACTIVITY: CPT | Performed by: INTERNAL MEDICINE

## 2020-01-01 PROCEDURE — 06L38CZ OCCLUSION OF ESOPHAGEAL VEIN WITH EXTRALUMINAL DEVICE, VIA NATURAL OR ARTIFICIAL OPENING ENDOSCOPIC: ICD-10-PCS | Performed by: INTERNAL MEDICINE

## 2020-01-01 PROCEDURE — 85730 THROMBOPLASTIN TIME PARTIAL: CPT | Performed by: INTERNAL MEDICINE

## 2020-01-01 PROCEDURE — 36600 WITHDRAWAL OF ARTERIAL BLOOD: CPT

## 2020-01-01 PROCEDURE — C1751 CATH, INF, PER/CENT/MIDLINE: HCPCS

## 2020-01-01 PROCEDURE — 82042 OTHER SOURCE ALBUMIN QUAN EA: CPT | Performed by: INTERNAL MEDICINE

## 2020-01-01 PROCEDURE — 25010000003 LIDOCAINE 1 % SOLUTION: Performed by: RADIOLOGY

## 2020-01-01 PROCEDURE — 80202 ASSAY OF VANCOMYCIN: CPT | Performed by: INTERNAL MEDICINE

## 2020-01-01 PROCEDURE — 84145 PROCALCITONIN (PCT): CPT | Performed by: INTERNAL MEDICINE

## 2020-01-01 PROCEDURE — 25010000002 CEFTRIAXONE PER 250 MG: Performed by: INTERNAL MEDICINE

## 2020-01-01 PROCEDURE — 25010000002 PHENYLEPHRINE 10 MG/ML SOLUTION 5 ML VIAL: Performed by: INTERNAL MEDICINE

## 2020-01-01 PROCEDURE — 86900 BLOOD TYPING SEROLOGIC ABO: CPT

## 2020-01-01 PROCEDURE — 85025 COMPLETE CBC W/AUTO DIFF WBC: CPT | Performed by: INTERNAL MEDICINE

## 2020-01-01 PROCEDURE — 87186 SC STD MICRODIL/AGAR DIL: CPT | Performed by: INTERNAL MEDICINE

## 2020-01-01 PROCEDURE — 02HV33Z INSERTION OF INFUSION DEVICE INTO SUPERIOR VENA CAVA, PERCUTANEOUS APPROACH: ICD-10-PCS | Performed by: INTERNAL MEDICINE

## 2020-01-01 PROCEDURE — 0DH68YZ INSERTION OF OTHER DEVICE INTO STOMACH, VIA NATURAL OR ARTIFICIAL OPENING ENDOSCOPIC: ICD-10-PCS | Performed by: INTERNAL MEDICINE

## 2020-01-01 PROCEDURE — 83605 ASSAY OF LACTIC ACID: CPT | Performed by: EMERGENCY MEDICINE

## 2020-01-01 PROCEDURE — 25010000002 POTASSIUM CHLORIDE PER 2 MEQ OF POTASSIUM: Performed by: INTERNAL MEDICINE

## 2020-01-01 PROCEDURE — 87040 BLOOD CULTURE FOR BACTERIA: CPT | Performed by: INTERNAL MEDICINE

## 2020-01-01 PROCEDURE — 25010000002 VANCOMYCIN PER 500 MG: Performed by: INTERNAL MEDICINE

## 2020-01-01 PROCEDURE — 0W9G3ZX DRAINAGE OF PERITONEAL CAVITY, PERCUTANEOUS APPROACH, DIAGNOSTIC: ICD-10-PCS | Performed by: RADIOLOGY

## 2020-01-01 PROCEDURE — 06HM33Z INSERTION OF INFUSION DEVICE INTO RIGHT FEMORAL VEIN, PERCUTANEOUS APPROACH: ICD-10-PCS | Performed by: INTERNAL MEDICINE

## 2020-01-01 PROCEDURE — 82330 ASSAY OF CALCIUM: CPT | Performed by: INTERNAL MEDICINE

## 2020-01-01 PROCEDURE — B548ZZA ULTRASONOGRAPHY OF SUPERIOR VENA CAVA, GUIDANCE: ICD-10-PCS | Performed by: INTERNAL MEDICINE

## 2020-01-01 PROCEDURE — 99291 CRITICAL CARE FIRST HOUR: CPT | Performed by: INTERNAL MEDICINE

## 2020-01-01 PROCEDURE — 25010000002 ONDANSETRON PER 1 MG: Performed by: INTERNAL MEDICINE

## 2020-01-01 PROCEDURE — 84132 ASSAY OF SERUM POTASSIUM: CPT | Performed by: INTERNAL MEDICINE

## 2020-01-01 PROCEDURE — 85610 PROTHROMBIN TIME: CPT | Performed by: EMERGENCY MEDICINE

## 2020-01-01 PROCEDURE — 25010000002 MORPHINE PER 10 MG: Performed by: EMERGENCY MEDICINE

## 2020-01-01 PROCEDURE — 25010000002 EPINEPHRINE 1 MG/ML SOLUTION 30 ML VIAL: Performed by: INTERNAL MEDICINE

## 2020-01-01 PROCEDURE — 36415 COLL VENOUS BLD VENIPUNCTURE: CPT | Performed by: INTERNAL MEDICINE

## 2020-01-01 PROCEDURE — 25010000002 PROPOFOL 10 MG/ML EMULSION

## 2020-01-01 PROCEDURE — 87147 CULTURE TYPE IMMUNOLOGIC: CPT | Performed by: INTERNAL MEDICINE

## 2020-01-01 PROCEDURE — 31500 INSERT EMERGENCY AIRWAY: CPT

## 2020-01-01 PROCEDURE — 86709 HEPATITIS A IGM ANTIBODY: CPT | Performed by: INTERNAL MEDICINE

## 2020-01-01 PROCEDURE — 43244 EGD VARICES LIGATION: CPT | Performed by: INTERNAL MEDICINE

## 2020-01-01 PROCEDURE — 87015 SPECIMEN INFECT AGNT CONCNTJ: CPT | Performed by: INTERNAL MEDICINE

## 2020-01-01 PROCEDURE — 76705 ECHO EXAM OF ABDOMEN: CPT

## 2020-01-01 PROCEDURE — 84484 ASSAY OF TROPONIN QUANT: CPT | Performed by: INTERNAL MEDICINE

## 2020-01-01 PROCEDURE — P9047 ALBUMIN (HUMAN), 25%, 50ML: HCPCS | Performed by: INTERNAL MEDICINE

## 2020-01-01 PROCEDURE — 25010000002 PHENYLEPHRINE 10 MG/ML SOLUTION

## 2020-01-01 PROCEDURE — 76942 ECHO GUIDE FOR BIOPSY: CPT

## 2020-01-01 PROCEDURE — 87522 HEPATITIS C REVRS TRNSCRPJ: CPT | Performed by: INTERNAL MEDICINE

## 2020-01-01 PROCEDURE — 86900 BLOOD TYPING SEROLOGIC ABO: CPT | Performed by: EMERGENCY MEDICINE

## 2020-01-01 PROCEDURE — 25010000002 ALBUMIN HUMAN 25% PER 50 ML: Performed by: INTERNAL MEDICINE

## 2020-01-01 PROCEDURE — 25010000002 VANCOMYCIN 10 G RECONSTITUTED SOLUTION: Performed by: INTERNAL MEDICINE

## 2020-01-01 PROCEDURE — 25010000002 MAGNESIUM SULFATE 2 GM/50ML SOLUTION: Performed by: INTERNAL MEDICINE

## 2020-01-01 PROCEDURE — 0CQ1XZZ REPAIR LOWER LIP, EXTERNAL APPROACH: ICD-10-PCS | Performed by: EMERGENCY MEDICINE

## 2020-01-01 PROCEDURE — 0202U NFCT DS 22 TRGT SARS-COV-2: CPT | Performed by: EMERGENCY MEDICINE

## 2020-01-01 PROCEDURE — 97163 PT EVAL HIGH COMPLEX 45 MIN: CPT

## 2020-01-01 PROCEDURE — 63710000001 INSULIN GLARGINE PER 5 UNITS: Performed by: INTERNAL MEDICINE

## 2020-01-01 PROCEDURE — 97110 THERAPEUTIC EXERCISES: CPT

## 2020-01-01 PROCEDURE — 93010 ELECTROCARDIOGRAM REPORT: CPT | Performed by: INTERNAL MEDICINE

## 2020-01-01 PROCEDURE — 94770: CPT

## 2020-01-01 PROCEDURE — 99291 CRITICAL CARE FIRST HOUR: CPT

## 2020-01-01 PROCEDURE — 25010000002 PROPOFOL 10 MG/ML EMULSION: Performed by: ANESTHESIOLOGY

## 2020-01-01 PROCEDURE — 93005 ELECTROCARDIOGRAM TRACING: CPT | Performed by: INTERNAL MEDICINE

## 2020-01-01 PROCEDURE — 86920 COMPATIBILITY TEST SPIN: CPT

## 2020-01-01 PROCEDURE — 99233 SBSQ HOSP IP/OBS HIGH 50: CPT | Performed by: THORACIC SURGERY (CARDIOTHORACIC VASCULAR SURGERY)

## 2020-01-01 PROCEDURE — 85025 COMPLETE CBC W/AUTO DIFF WBC: CPT | Performed by: EMERGENCY MEDICINE

## 2020-01-01 PROCEDURE — 25010000003 POTASSIUM CHLORIDE 10 MEQ/100ML SOLUTION: Performed by: INTERNAL MEDICINE

## 2020-01-01 PROCEDURE — 5A1955Z RESPIRATORY VENTILATION, GREATER THAN 96 CONSECUTIVE HOURS: ICD-10-PCS | Performed by: INTERNAL MEDICINE

## 2020-01-01 PROCEDURE — 72125 CT NECK SPINE W/O DYE: CPT

## 2020-01-01 PROCEDURE — 87641 MR-STAPH DNA AMP PROBE: CPT | Performed by: INTERNAL MEDICINE

## 2020-01-01 PROCEDURE — 05HY33Z INSERTION OF INFUSION DEVICE INTO UPPER VEIN, PERCUTANEOUS APPROACH: ICD-10-PCS | Performed by: INTERNAL MEDICINE

## 2020-01-01 PROCEDURE — 84157 ASSAY OF PROTEIN OTHER: CPT | Performed by: INTERNAL MEDICINE

## 2020-01-01 PROCEDURE — 87340 HEPATITIS B SURFACE AG IA: CPT | Performed by: INTERNAL MEDICINE

## 2020-01-01 PROCEDURE — 86850 RBC ANTIBODY SCREEN: CPT | Performed by: EMERGENCY MEDICINE

## 2020-01-01 PROCEDURE — 0BH17EZ INSERTION OF ENDOTRACHEAL AIRWAY INTO TRACHEA, VIA NATURAL OR ARTIFICIAL OPENING: ICD-10-PCS | Performed by: INTERNAL MEDICINE

## 2020-01-01 PROCEDURE — 0BJ08ZZ INSPECTION OF TRACHEOBRONCHIAL TREE, VIA NATURAL OR ARTIFICIAL OPENING ENDOSCOPIC: ICD-10-PCS | Performed by: INTERNAL MEDICINE

## 2020-01-01 PROCEDURE — 0W9G3ZZ DRAINAGE OF PERITONEAL CAVITY, PERCUTANEOUS APPROACH: ICD-10-PCS | Performed by: RADIOLOGY

## 2020-01-01 PROCEDURE — 25010000002 ALTEPLASE: Performed by: INTERNAL MEDICINE

## 2020-01-01 RX ORDER — MAGNESIUM SULFATE HEPTAHYDRATE 40 MG/ML
2 INJECTION, SOLUTION INTRAVENOUS AS NEEDED
Status: DISCONTINUED | OUTPATIENT
Start: 2020-01-01 | End: 2020-01-01 | Stop reason: HOSPADM

## 2020-01-01 RX ORDER — SODIUM CHLORIDE 0.9 % (FLUSH) 0.9 %
20 SYRINGE (ML) INJECTION AS NEEDED
Status: DISCONTINUED | OUTPATIENT
Start: 2020-01-01 | End: 2020-01-01 | Stop reason: HOSPADM

## 2020-01-01 RX ORDER — MORPHINE SULFATE 2 MG/ML
8 INJECTION, SOLUTION INTRAMUSCULAR; INTRAVENOUS ONCE
Status: COMPLETED | OUTPATIENT
Start: 2020-01-01 | End: 2020-01-01

## 2020-01-01 RX ORDER — ACETAMINOPHEN 160 MG/5ML
650 SOLUTION ORAL EVERY 4 HOURS PRN
Status: DISCONTINUED | OUTPATIENT
Start: 2020-01-01 | End: 2020-01-01 | Stop reason: HOSPADM

## 2020-01-01 RX ORDER — THIAMINE MONONITRATE (VIT B1) 100 MG
100 TABLET ORAL ONCE
Status: COMPLETED | OUTPATIENT
Start: 2020-01-01 | End: 2020-01-01

## 2020-01-01 RX ORDER — PHENYLEPHRINE HCL IN 0.9% NACL 0.5 MG/5ML
.5-3 SYRINGE (ML) INTRAVENOUS
Status: DISCONTINUED | OUTPATIENT
Start: 2020-01-01 | End: 2020-01-01 | Stop reason: CLARIF

## 2020-01-01 RX ORDER — CEFTRIAXONE SODIUM 1 G/50ML
1 INJECTION, SOLUTION INTRAVENOUS EVERY 24 HOURS
Status: DISCONTINUED | OUTPATIENT
Start: 2020-01-01 | End: 2020-01-01

## 2020-01-01 RX ORDER — LORAZEPAM 2 MG/ML
INJECTION INTRAMUSCULAR
Status: COMPLETED
Start: 2020-01-01 | End: 2020-01-01

## 2020-01-01 RX ORDER — MORPHINE SULFATE 2 MG/ML
INJECTION, SOLUTION INTRAMUSCULAR; INTRAVENOUS
Status: COMPLETED
Start: 2020-01-01 | End: 2020-01-01

## 2020-01-01 RX ORDER — ALBUTEROL SULFATE 90 UG/1
6 AEROSOL, METERED RESPIRATORY (INHALATION)
Status: DISCONTINUED | OUTPATIENT
Start: 2020-01-01 | End: 2020-01-01 | Stop reason: HOSPADM

## 2020-01-01 RX ORDER — MORPHINE SULFATE 20 MG/ML
20 SOLUTION ORAL
Status: DISCONTINUED | OUTPATIENT
Start: 2020-01-01 | End: 2020-01-01 | Stop reason: HOSPADM

## 2020-01-01 RX ORDER — ALBUTEROL SULFATE 90 UG/1
2 AEROSOL, METERED RESPIRATORY (INHALATION)
Status: DISCONTINUED | OUTPATIENT
Start: 2020-01-01 | End: 2020-01-01

## 2020-01-01 RX ORDER — ACETYLCYSTEINE 200 MG/ML
2 SOLUTION ORAL; RESPIRATORY (INHALATION)
Status: DISCONTINUED | OUTPATIENT
Start: 2020-01-01 | End: 2020-01-01 | Stop reason: HOSPADM

## 2020-01-01 RX ORDER — FENTANYL CITRATE 50 UG/ML
25 INJECTION, SOLUTION INTRAMUSCULAR; INTRAVENOUS
Status: DISCONTINUED | OUTPATIENT
Start: 2020-01-01 | End: 2020-01-01

## 2020-01-01 RX ORDER — LORAZEPAM 2 MG/ML
1 INJECTION INTRAMUSCULAR
Status: DISCONTINUED | OUTPATIENT
Start: 2020-01-01 | End: 2020-01-01

## 2020-01-01 RX ORDER — MORPHINE SULFATE 2 MG/ML
2 INJECTION, SOLUTION INTRAMUSCULAR; INTRAVENOUS ONCE
Status: COMPLETED | OUTPATIENT
Start: 2020-01-01 | End: 2020-01-01

## 2020-01-01 RX ORDER — FUROSEMIDE 10 MG/ML
40 INJECTION INTRAMUSCULAR; INTRAVENOUS ONCE
Status: COMPLETED | OUTPATIENT
Start: 2020-01-01 | End: 2020-01-01

## 2020-01-01 RX ORDER — ALBUMIN (HUMAN) 12.5 G/50ML
25 SOLUTION INTRAVENOUS ONCE
Status: COMPLETED | OUTPATIENT
Start: 2020-01-01 | End: 2020-01-01

## 2020-01-01 RX ORDER — DEXTROSE MONOHYDRATE 25 G/50ML
25 INJECTION, SOLUTION INTRAVENOUS
Status: DISCONTINUED | OUTPATIENT
Start: 2020-01-01 | End: 2020-01-01 | Stop reason: HOSPADM

## 2020-01-01 RX ORDER — ACETAMINOPHEN 325 MG/1
650 TABLET ORAL EVERY 4 HOURS PRN
Status: DISCONTINUED | OUTPATIENT
Start: 2020-01-01 | End: 2020-01-01 | Stop reason: HOSPADM

## 2020-01-01 RX ORDER — LIDOCAINE HYDROCHLORIDE 10 MG/ML
20 INJECTION, SOLUTION INFILTRATION; PERINEURAL ONCE
Status: COMPLETED | OUTPATIENT
Start: 2020-01-01 | End: 2020-01-01

## 2020-01-01 RX ORDER — SODIUM CHLORIDE 0.9 % (FLUSH) 0.9 %
10 SYRINGE (ML) INJECTION EVERY 12 HOURS SCHEDULED
Status: DISCONTINUED | OUTPATIENT
Start: 2020-01-01 | End: 2020-01-01

## 2020-01-01 RX ORDER — FUROSEMIDE 10 MG/ML
20 INJECTION INTRAMUSCULAR; INTRAVENOUS DAILY
Status: DISCONTINUED | OUTPATIENT
Start: 2020-01-01 | End: 2020-01-01 | Stop reason: HOSPADM

## 2020-01-01 RX ORDER — LORAZEPAM 2 MG/ML
1 INJECTION INTRAMUSCULAR EVERY 4 HOURS PRN
Status: DISCONTINUED | OUTPATIENT
Start: 2020-01-01 | End: 2020-01-01

## 2020-01-01 RX ORDER — SODIUM CHLORIDE 0.9 % (FLUSH) 0.9 %
10 SYRINGE (ML) INJECTION EVERY 12 HOURS SCHEDULED
Status: DISCONTINUED | OUTPATIENT
Start: 2020-01-01 | End: 2020-01-01 | Stop reason: HOSPADM

## 2020-01-01 RX ORDER — POTASSIUM CHLORIDE 7.45 MG/ML
10 INJECTION INTRAVENOUS
Status: DISCONTINUED | OUTPATIENT
Start: 2020-01-01 | End: 2020-01-01

## 2020-01-01 RX ORDER — OXYCODONE AND ACETAMINOPHEN 10; 325 MG/1; MG/1
1 TABLET ORAL EVERY 6 HOURS PRN
Status: DISCONTINUED | OUTPATIENT
Start: 2020-01-01 | End: 2020-01-01

## 2020-01-01 RX ORDER — QUETIAPINE FUMARATE 300 MG/1
300 TABLET, FILM COATED ORAL NIGHTLY
COMMUNITY

## 2020-01-01 RX ORDER — LORAZEPAM 1 MG/1
1 TABLET ORAL
Status: DISCONTINUED | OUTPATIENT
Start: 2020-01-01 | End: 2020-01-01

## 2020-01-01 RX ORDER — LORAZEPAM 2 MG/ML
0.5 INJECTION INTRAMUSCULAR
Status: DISCONTINUED | OUTPATIENT
Start: 2020-01-01 | End: 2020-01-01

## 2020-01-01 RX ORDER — THIAMINE MONONITRATE (VIT B1) 100 MG
100 TABLET ORAL DAILY
Status: DISCONTINUED | OUTPATIENT
Start: 2020-01-01 | End: 2020-01-01

## 2020-01-01 RX ORDER — POTASSIUM CHLORIDE 29.8 MG/ML
20 INJECTION INTRAVENOUS
Status: DISCONTINUED | OUTPATIENT
Start: 2020-01-01 | End: 2020-01-01

## 2020-01-01 RX ORDER — MAGNESIUM SULFATE HEPTAHYDRATE 40 MG/ML
4 INJECTION, SOLUTION INTRAVENOUS AS NEEDED
Status: DISCONTINUED | OUTPATIENT
Start: 2020-01-01 | End: 2020-01-01 | Stop reason: HOSPADM

## 2020-01-01 RX ORDER — DIPHENOXYLATE HYDROCHLORIDE AND ATROPINE SULFATE 2.5; .025 MG/1; MG/1
1 TABLET ORAL DAILY
Status: DISCONTINUED | OUTPATIENT
Start: 2020-01-01 | End: 2020-01-01

## 2020-01-01 RX ORDER — LORAZEPAM 0.5 MG/1
0.5 TABLET ORAL
Status: DISCONTINUED | OUTPATIENT
Start: 2020-01-01 | End: 2020-01-01

## 2020-01-01 RX ORDER — FENTANYL CITRATE 50 UG/ML
100 INJECTION, SOLUTION INTRAMUSCULAR; INTRAVENOUS ONCE
Status: COMPLETED | OUTPATIENT
Start: 2020-01-01 | End: 2020-01-01

## 2020-01-01 RX ORDER — LIDOCAINE HYDROCHLORIDE 10 MG/ML
10 INJECTION, SOLUTION INFILTRATION; PERINEURAL ONCE
Status: COMPLETED | OUTPATIENT
Start: 2020-01-01 | End: 2020-01-01

## 2020-01-01 RX ORDER — LORAZEPAM 2 MG/ML
0.5 INJECTION INTRAMUSCULAR
Status: DISCONTINUED | OUTPATIENT
Start: 2020-01-01 | End: 2020-01-01 | Stop reason: HOSPADM

## 2020-01-01 RX ORDER — PROPOFOL 10 MG/ML
VIAL (ML) INTRAVENOUS
Status: COMPLETED
Start: 2020-01-01 | End: 2020-01-01

## 2020-01-01 RX ORDER — NOREPINEPHRINE BIT/0.9 % NACL 8 MG/250ML
.02-.3 INFUSION BOTTLE (ML) INTRAVENOUS
Status: DISCONTINUED | OUTPATIENT
Start: 2020-01-01 | End: 2020-01-01

## 2020-01-01 RX ORDER — MORPHINE SULFATE 20 MG/ML
5 SOLUTION ORAL
Status: DISCONTINUED | OUTPATIENT
Start: 2020-01-01 | End: 2020-01-01 | Stop reason: HOSPADM

## 2020-01-01 RX ORDER — MORPHINE SULFATE 2 MG/ML
4 INJECTION, SOLUTION INTRAMUSCULAR; INTRAVENOUS
Status: DISCONTINUED | OUTPATIENT
Start: 2020-01-01 | End: 2020-01-01 | Stop reason: HOSPADM

## 2020-01-01 RX ORDER — ASPIRIN 325 MG
325 TABLET ORAL DAILY
COMMUNITY

## 2020-01-01 RX ORDER — MORPHINE SULFATE 2 MG/ML
6 INJECTION, SOLUTION INTRAMUSCULAR; INTRAVENOUS
Status: DISCONTINUED | OUTPATIENT
Start: 2020-01-01 | End: 2020-01-01 | Stop reason: HOSPADM

## 2020-01-01 RX ORDER — SODIUM CHLORIDE 0.9 % (FLUSH) 0.9 %
10 SYRINGE (ML) INJECTION AS NEEDED
Status: DISCONTINUED | OUTPATIENT
Start: 2020-01-01 | End: 2020-01-01 | Stop reason: HOSPADM

## 2020-01-01 RX ORDER — ONDANSETRON 2 MG/ML
4 INJECTION INTRAMUSCULAR; INTRAVENOUS EVERY 6 HOURS PRN
Status: DISCONTINUED | OUTPATIENT
Start: 2020-01-01 | End: 2020-01-01 | Stop reason: HOSPADM

## 2020-01-01 RX ORDER — DIPHENOXYLATE HYDROCHLORIDE AND ATROPINE SULFATE 2.5; .025 MG/1; MG/1
1 TABLET ORAL
Status: DISCONTINUED | OUTPATIENT
Start: 2020-01-01 | End: 2020-01-01 | Stop reason: HOSPADM

## 2020-01-01 RX ORDER — FOLIC ACID 1 MG/1
1 TABLET ORAL DAILY
Status: DISCONTINUED | OUTPATIENT
Start: 2020-01-01 | End: 2020-01-01

## 2020-01-01 RX ORDER — MORPHINE SULFATE 2 MG/ML
1 INJECTION, SOLUTION INTRAMUSCULAR; INTRAVENOUS EVERY 4 HOURS PRN
Status: DISCONTINUED | OUTPATIENT
Start: 2020-01-01 | End: 2020-01-01

## 2020-01-01 RX ORDER — FENTANYL CITRATE 50 UG/ML
50 INJECTION, SOLUTION INTRAMUSCULAR; INTRAVENOUS
Status: DISCONTINUED | OUTPATIENT
Start: 2020-01-01 | End: 2020-01-01

## 2020-01-01 RX ORDER — VANCOMYCIN HYDROCHLORIDE 1 G/200ML
1000 INJECTION, SOLUTION INTRAVENOUS EVERY 12 HOURS
Status: DISCONTINUED | OUTPATIENT
Start: 2020-01-01 | End: 2020-01-01

## 2020-01-01 RX ORDER — FENTANYL CITRATE 50 UG/ML
50 INJECTION, SOLUTION INTRAMUSCULAR; INTRAVENOUS
Status: DISCONTINUED | OUTPATIENT
Start: 2020-01-01 | End: 2020-01-01 | Stop reason: HOSPADM

## 2020-01-01 RX ORDER — CEFTRIAXONE SODIUM 1 G/50ML
1 INJECTION, SOLUTION INTRAVENOUS EVERY 24 HOURS
Status: DISCONTINUED | OUTPATIENT
Start: 2020-01-01 | End: 2020-01-01 | Stop reason: HOSPADM

## 2020-01-01 RX ORDER — NICOTINE POLACRILEX 4 MG
15 LOZENGE BUCCAL
Status: DISCONTINUED | OUTPATIENT
Start: 2020-01-01 | End: 2020-01-01 | Stop reason: HOSPADM

## 2020-01-01 RX ORDER — PANTOPRAZOLE SODIUM 40 MG/10ML
40 INJECTION, POWDER, LYOPHILIZED, FOR SOLUTION INTRAVENOUS EVERY 12 HOURS SCHEDULED
Status: DISCONTINUED | OUTPATIENT
Start: 2020-01-01 | End: 2020-01-01 | Stop reason: HOSPADM

## 2020-01-01 RX ORDER — LABETALOL HYDROCHLORIDE 5 MG/ML
20 INJECTION, SOLUTION INTRAVENOUS
Status: DISCONTINUED | OUTPATIENT
Start: 2020-01-01 | End: 2020-01-01

## 2020-01-01 RX ORDER — PHENYLEPHRINE HYDROCHLORIDE 10 MG/ML
INJECTION INTRAVENOUS
Status: COMPLETED
Start: 2020-01-01 | End: 2020-01-01

## 2020-01-01 RX ORDER — NADOLOL 20 MG/1
20 TABLET ORAL
Status: DISCONTINUED | OUTPATIENT
Start: 2020-01-01 | End: 2020-01-01

## 2020-01-01 RX ORDER — LORAZEPAM 2 MG/ML
2 INJECTION INTRAMUSCULAR
Status: DISCONTINUED | OUTPATIENT
Start: 2020-01-01 | End: 2020-01-01 | Stop reason: HOSPADM

## 2020-01-01 RX ORDER — PANTOPRAZOLE SODIUM 40 MG/10ML
80 INJECTION, POWDER, LYOPHILIZED, FOR SOLUTION INTRAVENOUS ONCE
Status: COMPLETED | OUTPATIENT
Start: 2020-01-01 | End: 2020-01-01

## 2020-01-01 RX ORDER — LORAZEPAM 2 MG/ML
0.5 CONCENTRATE ORAL
Status: DISCONTINUED | OUTPATIENT
Start: 2020-01-01 | End: 2020-01-01 | Stop reason: HOSPADM

## 2020-01-01 RX ORDER — SODIUM CHLORIDE, SODIUM LACTATE, POTASSIUM CHLORIDE, CALCIUM CHLORIDE 600; 310; 30; 20 MG/100ML; MG/100ML; MG/100ML; MG/100ML
75 INJECTION, SOLUTION INTRAVENOUS CONTINUOUS
Status: DISCONTINUED | OUTPATIENT
Start: 2020-01-01 | End: 2020-01-01

## 2020-01-01 RX ORDER — CHLORHEXIDINE GLUCONATE 0.12 MG/ML
15 RINSE ORAL EVERY 12 HOURS SCHEDULED
Status: DISCONTINUED | OUTPATIENT
Start: 2020-01-01 | End: 2020-01-01 | Stop reason: HOSPADM

## 2020-01-01 RX ORDER — NOREPINEPHRINE BIT/0.9 % NACL 8 MG/250ML
.02-.3 INFUSION BOTTLE (ML) INTRAVENOUS
Status: DISCONTINUED | OUTPATIENT
Start: 2020-01-01 | End: 2020-01-01 | Stop reason: HOSPADM

## 2020-01-01 RX ORDER — POTASSIUM CHLORIDE 29.8 MG/ML
20 INJECTION INTRAVENOUS
Status: DISCONTINUED | OUTPATIENT
Start: 2020-01-01 | End: 2020-01-01 | Stop reason: HOSPADM

## 2020-01-01 RX ORDER — PROPOFOL 10 MG/ML
VIAL (ML) INTRAVENOUS CONTINUOUS PRN
Status: DISCONTINUED | OUTPATIENT
Start: 2020-01-01 | End: 2020-01-01 | Stop reason: SURG

## 2020-01-01 RX ORDER — OCTREOTIDE ACETATE 50 UG/ML
50 INJECTION, SOLUTION INTRAVENOUS; SUBCUTANEOUS ONCE
Status: COMPLETED | OUTPATIENT
Start: 2020-01-01 | End: 2020-01-01

## 2020-01-01 RX ORDER — LORAZEPAM 2 MG/ML
2 CONCENTRATE ORAL
Status: DISCONTINUED | OUTPATIENT
Start: 2020-01-01 | End: 2020-01-01 | Stop reason: HOSPADM

## 2020-01-01 RX ORDER — SODIUM BICARBONATE IN D5W 150/1000ML
150 PLASTIC BAG, INJECTION (ML) INTRAVENOUS CONTINUOUS
Status: DISCONTINUED | OUTPATIENT
Start: 2020-01-01 | End: 2020-01-01

## 2020-01-01 RX ORDER — LIDOCAINE HYDROCHLORIDE 20 MG/ML
5 SOLUTION OROPHARYNGEAL EVERY 4 HOURS PRN
Status: DISCONTINUED | OUTPATIENT
Start: 2020-01-01 | End: 2020-01-01 | Stop reason: HOSPADM

## 2020-01-01 RX ORDER — LORAZEPAM 1 MG/1
2 TABLET ORAL
Status: DISCONTINUED | OUTPATIENT
Start: 2020-01-01 | End: 2020-01-01

## 2020-01-01 RX ORDER — AMOXICILLIN 250 MG
2 CAPSULE ORAL NIGHTLY PRN
Status: DISCONTINUED | OUTPATIENT
Start: 2020-01-01 | End: 2020-01-01 | Stop reason: HOSPADM

## 2020-01-01 RX ORDER — IPRATROPIUM BROMIDE AND ALBUTEROL SULFATE 2.5; .5 MG/3ML; MG/3ML
3 SOLUTION RESPIRATORY (INHALATION) EVERY 6 HOURS PRN
Status: DISCONTINUED | OUTPATIENT
Start: 2020-01-01 | End: 2020-01-01 | Stop reason: HOSPADM

## 2020-01-01 RX ORDER — ALUMINA, MAGNESIA, AND SIMETHICONE 2400; 2400; 240 MG/30ML; MG/30ML; MG/30ML
15 SUSPENSION ORAL EVERY 6 HOURS PRN
Status: DISCONTINUED | OUTPATIENT
Start: 2020-01-01 | End: 2020-01-01

## 2020-01-01 RX ORDER — MORPHINE SULFATE 20 MG/ML
10 SOLUTION ORAL
Status: DISCONTINUED | OUTPATIENT
Start: 2020-01-01 | End: 2020-01-01 | Stop reason: HOSPADM

## 2020-01-01 RX ORDER — FENTANYL CITRATE 50 UG/ML
100 INJECTION, SOLUTION INTRAMUSCULAR; INTRAVENOUS
Status: DISCONTINUED | OUTPATIENT
Start: 2020-01-01 | End: 2020-01-01

## 2020-01-01 RX ORDER — LORAZEPAM 2 MG/ML
2 INJECTION INTRAMUSCULAR
Status: DISCONTINUED | OUTPATIENT
Start: 2020-01-01 | End: 2020-01-01

## 2020-01-01 RX ORDER — ETOMIDATE 2 MG/ML
10 INJECTION INTRAVENOUS ONCE
Status: COMPLETED | OUTPATIENT
Start: 2020-01-01 | End: 2020-01-01

## 2020-01-01 RX ORDER — LORAZEPAM 2 MG/ML
1 INJECTION INTRAMUSCULAR
Status: DISCONTINUED | OUTPATIENT
Start: 2020-01-01 | End: 2020-01-01 | Stop reason: HOSPADM

## 2020-01-01 RX ORDER — ACETAMINOPHEN 650 MG/1
650 SUPPOSITORY RECTAL EVERY 4 HOURS PRN
Status: DISCONTINUED | OUTPATIENT
Start: 2020-01-01 | End: 2020-01-01 | Stop reason: HOSPADM

## 2020-01-01 RX ORDER — INSULIN GLARGINE 100 [IU]/ML
20 INJECTION, SOLUTION SUBCUTANEOUS EVERY MORNING
Status: DISCONTINUED | OUTPATIENT
Start: 2020-01-01 | End: 2020-01-01 | Stop reason: HOSPADM

## 2020-01-01 RX ORDER — MORPHINE SULFATE 2 MG/ML
2 INJECTION, SOLUTION INTRAMUSCULAR; INTRAVENOUS
Status: DISCONTINUED | OUTPATIENT
Start: 2020-01-01 | End: 2020-01-01 | Stop reason: HOSPADM

## 2020-01-01 RX ORDER — LORAZEPAM 2 MG/ML
1 CONCENTRATE ORAL
Status: DISCONTINUED | OUTPATIENT
Start: 2020-01-01 | End: 2020-01-01 | Stop reason: HOSPADM

## 2020-01-01 RX ORDER — HYDROMORPHONE HYDROCHLORIDE 1 MG/ML
0.5 INJECTION, SOLUTION INTRAMUSCULAR; INTRAVENOUS; SUBCUTANEOUS
Status: DISCONTINUED | OUTPATIENT
Start: 2020-01-01 | End: 2020-01-01 | Stop reason: HOSPADM

## 2020-01-01 RX ORDER — OXYCODONE AND ACETAMINOPHEN 10; 325 MG/1; MG/1
1 TABLET ORAL EVERY 6 HOURS PRN
COMMUNITY

## 2020-01-01 RX ADMIN — INSULIN LISPRO 2 UNITS: 100 INJECTION, SOLUTION INTRAVENOUS; SUBCUTANEOUS at 00:10

## 2020-01-01 RX ADMIN — VANCOMYCIN HYDROCHLORIDE 1000 MG: 1 INJECTION, SOLUTION INTRAVENOUS at 10:47

## 2020-01-01 RX ADMIN — CALCIUM GLUCONATE: 94 INJECTION, SOLUTION INTRAVENOUS at 17:55

## 2020-01-01 RX ADMIN — Medication 0.02 MCG/KG/MIN: at 01:52

## 2020-01-01 RX ADMIN — INSULIN GLARGINE 20 UNITS: 100 INJECTION, SOLUTION SUBCUTANEOUS at 13:07

## 2020-01-01 RX ADMIN — SODIUM CHLORIDE, PRESERVATIVE FREE 10 ML: 5 INJECTION INTRAVENOUS at 08:04

## 2020-01-01 RX ADMIN — SODIUM CHLORIDE, POTASSIUM CHLORIDE, SODIUM LACTATE AND CALCIUM CHLORIDE 2595 ML: 600; 310; 30; 20 INJECTION, SOLUTION INTRAVENOUS at 22:19

## 2020-01-01 RX ADMIN — CHLORHEXIDINE GLUCONATE 15 ML: 1.2 RINSE ORAL at 20:35

## 2020-01-01 RX ADMIN — LACTULOSE: 10 SOLUTION ORAL at 11:13

## 2020-01-01 RX ADMIN — FUROSEMIDE 20 MG: 20 INJECTION, SOLUTION INTRAMUSCULAR; INTRAVENOUS at 13:07

## 2020-01-01 RX ADMIN — INSULIN HUMAN 8 UNITS: 100 INJECTION, SOLUTION PARENTERAL at 08:17

## 2020-01-01 RX ADMIN — CHLORHEXIDINE GLUCONATE 15 ML: 1.2 RINSE ORAL at 08:01

## 2020-01-01 RX ADMIN — PANTOPRAZOLE SODIUM 40 MG: 40 INJECTION, POWDER, FOR SOLUTION INTRAVENOUS at 09:06

## 2020-01-01 RX ADMIN — PANTOPRAZOLE SODIUM 40 MG: 40 INJECTION, POWDER, FOR SOLUTION INTRAVENOUS at 08:01

## 2020-01-01 RX ADMIN — SODIUM CHLORIDE, PRESERVATIVE FREE 10 ML: 5 INJECTION INTRAVENOUS at 14:12

## 2020-01-01 RX ADMIN — MORPHINE SULFATE 1 MG: 2 INJECTION, SOLUTION INTRAMUSCULAR; INTRAVENOUS at 06:41

## 2020-01-01 RX ADMIN — POTASSIUM CHLORIDE 20 MEQ: 29.8 INJECTION, SOLUTION INTRAVENOUS at 18:14

## 2020-01-01 RX ADMIN — VANCOMYCIN HYDROCHLORIDE 1250 MG: 10 INJECTION, POWDER, LYOPHILIZED, FOR SOLUTION INTRAVENOUS at 00:12

## 2020-01-01 RX ADMIN — TAZOBACTAM SODIUM AND PIPERACILLIN SODIUM 4.5 G: 500; 4 INJECTION, SOLUTION INTRAVENOUS at 19:40

## 2020-01-01 RX ADMIN — FOLIC ACID 1 MG: 5 INJECTION, SOLUTION INTRAMUSCULAR; INTRAVENOUS; SUBCUTANEOUS at 09:46

## 2020-01-01 RX ADMIN — INSULIN HUMAN 12 UNITS: 100 INJECTION, SOLUTION PARENTERAL at 00:54

## 2020-01-01 RX ADMIN — CHLORHEXIDINE GLUCONATE 15 ML: 1.2 RINSE ORAL at 20:22

## 2020-01-01 RX ADMIN — INSULIN LISPRO 5 UNITS: 100 INJECTION, SOLUTION INTRAVENOUS; SUBCUTANEOUS at 00:29

## 2020-01-01 RX ADMIN — INSULIN HUMAN 8 UNITS: 100 INJECTION, SOLUTION PARENTERAL at 20:26

## 2020-01-01 RX ADMIN — SODIUM CHLORIDE, PRESERVATIVE FREE 10 ML: 5 INJECTION INTRAVENOUS at 20:05

## 2020-01-01 RX ADMIN — POTASSIUM CHLORIDE 10 MEQ: 7.46 INJECTION, SOLUTION INTRAVENOUS at 07:08

## 2020-01-01 RX ADMIN — POTASSIUM CHLORIDE 10 MEQ: 7.46 INJECTION, SOLUTION INTRAVENOUS at 11:34

## 2020-01-01 RX ADMIN — INSULIN HUMAN 12 UNITS: 100 INJECTION, SOLUTION PARENTERAL at 12:07

## 2020-01-01 RX ADMIN — VASOPRESSIN 0.03 UNITS/MIN: 20 INJECTION INTRAVENOUS at 03:35

## 2020-01-01 RX ADMIN — INSULIN LISPRO 4 UNITS: 100 INJECTION, SOLUTION INTRAVENOUS; SUBCUTANEOUS at 08:02

## 2020-01-01 RX ADMIN — INSULIN LISPRO 2 UNITS: 100 INJECTION, SOLUTION INTRAVENOUS; SUBCUTANEOUS at 23:31

## 2020-01-01 RX ADMIN — THIAMINE HYDROCHLORIDE 100 MG: 100 INJECTION, SOLUTION INTRAMUSCULAR; INTRAVENOUS at 10:05

## 2020-01-01 RX ADMIN — POTASSIUM CHLORIDE 20 MEQ: 400 INJECTION, SOLUTION INTRAVENOUS at 16:15

## 2020-01-01 RX ADMIN — PROPOFOL 5 MCG/KG/MIN: 10 INJECTION, EMULSION INTRAVENOUS at 20:05

## 2020-01-01 RX ADMIN — SODIUM CHLORIDE, PRESERVATIVE FREE 10 ML: 5 INJECTION INTRAVENOUS at 20:23

## 2020-01-01 RX ADMIN — PROPOFOL 10 MCG/KG/MIN: 10 INJECTION, EMULSION INTRAVENOUS at 05:53

## 2020-01-01 RX ADMIN — SODIUM CHLORIDE 8 MG/HR: 900 INJECTION INTRAVENOUS at 00:17

## 2020-01-01 RX ADMIN — SODIUM CHLORIDE, PRESERVATIVE FREE 10 ML: 5 INJECTION INTRAVENOUS at 08:03

## 2020-01-01 RX ADMIN — METOPROLOL TARTRATE 2.5 MG: 5 INJECTION, SOLUTION INTRAVENOUS at 00:44

## 2020-01-01 RX ADMIN — SODIUM CHLORIDE, PRESERVATIVE FREE 10 ML: 5 INJECTION INTRAVENOUS at 08:13

## 2020-01-01 RX ADMIN — PROPOFOL 20 MCG/KG/MIN: 10 INJECTION, EMULSION INTRAVENOUS at 08:02

## 2020-01-01 RX ADMIN — POTASSIUM CHLORIDE 10 MEQ: 7.46 INJECTION, SOLUTION INTRAVENOUS at 22:14

## 2020-01-01 RX ADMIN — CHLORHEXIDINE GLUCONATE 15 ML: 1.2 RINSE ORAL at 08:10

## 2020-01-01 RX ADMIN — SODIUM CHLORIDE 1000 ML: 9 INJECTION, SOLUTION INTRAVENOUS at 14:50

## 2020-01-01 RX ADMIN — FENTANYL CITRATE 50 MCG: 0.05 INJECTION, SOLUTION INTRAMUSCULAR; INTRAVENOUS at 03:04

## 2020-01-01 RX ADMIN — POTASSIUM CHLORIDE: 2 INJECTION, SOLUTION, CONCENTRATE INTRAVENOUS at 18:35

## 2020-01-01 RX ADMIN — LORAZEPAM 1 MG: 2 INJECTION INTRAMUSCULAR; INTRAVENOUS at 11:50

## 2020-01-01 RX ADMIN — CALCIUM GLUCONATE: 94 INJECTION, SOLUTION INTRAVENOUS at 17:52

## 2020-01-01 RX ADMIN — TAZOBACTAM SODIUM AND PIPERACILLIN SODIUM 4.5 G: 500; 4 INJECTION, SOLUTION INTRAVENOUS at 20:38

## 2020-01-01 RX ADMIN — FENTANYL CITRATE 100 MCG: 50 INJECTION, SOLUTION INTRAMUSCULAR; INTRAVENOUS at 00:32

## 2020-01-01 RX ADMIN — CHLORHEXIDINE GLUCONATE 15 ML: 1.2 RINSE ORAL at 20:27

## 2020-01-01 RX ADMIN — POTASSIUM CHLORIDE 10 MEQ: 7.46 INJECTION, SOLUTION INTRAVENOUS at 11:52

## 2020-01-01 RX ADMIN — PANTOPRAZOLE SODIUM 40 MG: 40 INJECTION, POWDER, FOR SOLUTION INTRAVENOUS at 20:02

## 2020-01-01 RX ADMIN — FOLIC ACID 1 MG: 5 INJECTION, SOLUTION INTRAMUSCULAR; INTRAVENOUS; SUBCUTANEOUS at 13:01

## 2020-01-01 RX ADMIN — MORPHINE SULFATE 8 MG: 2 INJECTION, SOLUTION INTRAMUSCULAR; INTRAVENOUS at 01:15

## 2020-01-01 RX ADMIN — INSULIN HUMAN 12 UNITS: 100 INJECTION, SOLUTION PARENTERAL at 11:47

## 2020-01-01 RX ADMIN — METOPROLOL TARTRATE 2.5 MG: 5 INJECTION, SOLUTION INTRAVENOUS at 00:00

## 2020-01-01 RX ADMIN — SODIUM CHLORIDE, PRESERVATIVE FREE 10 ML: 5 INJECTION INTRAVENOUS at 20:42

## 2020-01-01 RX ADMIN — FENTANYL CITRATE 50 MCG: 0.05 INJECTION, SOLUTION INTRAMUSCULAR; INTRAVENOUS at 04:09

## 2020-01-01 RX ADMIN — LORAZEPAM 1 MG: 2 INJECTION INTRAMUSCULAR; INTRAVENOUS at 11:35

## 2020-01-01 RX ADMIN — SODIUM CHLORIDE, PRESERVATIVE FREE 10 ML: 5 INJECTION INTRAVENOUS at 20:34

## 2020-01-01 RX ADMIN — SODIUM CHLORIDE, PRESERVATIVE FREE 10 ML: 5 INJECTION INTRAVENOUS at 08:12

## 2020-01-01 RX ADMIN — PANTOPRAZOLE SODIUM 40 MG: 40 INJECTION, POWDER, FOR SOLUTION INTRAVENOUS at 08:11

## 2020-01-01 RX ADMIN — METOPROLOL TARTRATE 2.5 MG: 5 INJECTION, SOLUTION INTRAVENOUS at 16:13

## 2020-01-01 RX ADMIN — FOLIC ACID 1 MG: 5 INJECTION, SOLUTION INTRAMUSCULAR; INTRAVENOUS; SUBCUTANEOUS at 10:05

## 2020-01-01 RX ADMIN — SODIUM CHLORIDE, PRESERVATIVE FREE 10 ML: 5 INJECTION INTRAVENOUS at 09:06

## 2020-01-01 RX ADMIN — FENTANYL CITRATE 50 MCG: 0.05 INJECTION, SOLUTION INTRAMUSCULAR; INTRAVENOUS at 14:50

## 2020-01-01 RX ADMIN — CHLORHEXIDINE GLUCONATE 15 ML: 1.2 RINSE ORAL at 20:34

## 2020-01-01 RX ADMIN — PHENYLEPHRINE HYDROCHLORIDE 10 MG: 10 INJECTION INTRAVENOUS at 16:46

## 2020-01-01 RX ADMIN — THIAMINE HYDROCHLORIDE 100 MG: 100 INJECTION, SOLUTION INTRAMUSCULAR; INTRAVENOUS at 08:12

## 2020-01-01 RX ADMIN — SODIUM CHLORIDE, PRESERVATIVE FREE 10 ML: 5 INJECTION INTRAVENOUS at 08:14

## 2020-01-01 RX ADMIN — TAZOBACTAM SODIUM AND PIPERACILLIN SODIUM 4.5 G: 500; 4 INJECTION, SOLUTION INTRAVENOUS at 03:06

## 2020-01-01 RX ADMIN — MAGNESIUM SULFATE 2 G: 2 INJECTION INTRAVENOUS at 00:18

## 2020-01-01 RX ADMIN — INSULIN HUMAN 8 UNITS: 100 INJECTION, SOLUTION PARENTERAL at 20:40

## 2020-01-01 RX ADMIN — CHLORHEXIDINE GLUCONATE 15 ML: 1.2 RINSE ORAL at 09:05

## 2020-01-01 RX ADMIN — SODIUM CHLORIDE, PRESERVATIVE FREE 10 ML: 5 INJECTION INTRAVENOUS at 20:55

## 2020-01-01 RX ADMIN — POTASSIUM CHLORIDE 20 MEQ: 29.8 INJECTION, SOLUTION INTRAVENOUS at 16:25

## 2020-01-01 RX ADMIN — INSULIN LISPRO 2 UNITS: 100 INJECTION, SOLUTION INTRAVENOUS; SUBCUTANEOUS at 16:02

## 2020-01-01 RX ADMIN — INSULIN HUMAN 8 UNITS: 100 INJECTION, SOLUTION PARENTERAL at 11:51

## 2020-01-01 RX ADMIN — LORAZEPAM 2 MG: 2 INJECTION INTRAMUSCULAR; INTRAVENOUS at 12:53

## 2020-01-01 RX ADMIN — SODIUM CHLORIDE, POTASSIUM CHLORIDE, SODIUM LACTATE AND CALCIUM CHLORIDE 125 ML/HR: 600; 310; 30; 20 INJECTION, SOLUTION INTRAVENOUS at 23:40

## 2020-01-01 RX ADMIN — FUROSEMIDE 40 MG: 10 INJECTION, SOLUTION INTRAMUSCULAR; INTRAVENOUS at 03:36

## 2020-01-01 RX ADMIN — CALCIUM GLUCONATE: 94 INJECTION, SOLUTION INTRAVENOUS at 18:33

## 2020-01-01 RX ADMIN — SODIUM CHLORIDE, PRESERVATIVE FREE 10 ML: 5 INJECTION INTRAVENOUS at 21:00

## 2020-01-01 RX ADMIN — LORAZEPAM 2 MG: 2 INJECTION INTRAMUSCULAR; INTRAVENOUS at 19:34

## 2020-01-01 RX ADMIN — FENTANYL CITRATE 50 MCG: 50 INJECTION, SOLUTION INTRAMUSCULAR; INTRAVENOUS at 12:24

## 2020-01-01 RX ADMIN — POTASSIUM CHLORIDE 10 MEQ: 7.46 INJECTION, SOLUTION INTRAVENOUS at 13:33

## 2020-01-01 RX ADMIN — PANTOPRAZOLE SODIUM 40 MG: 40 INJECTION, POWDER, FOR SOLUTION INTRAVENOUS at 20:05

## 2020-01-01 RX ADMIN — OCTREOTIDE ACETATE 25 MCG/HR: 500 INJECTION, SOLUTION INTRAVENOUS; SUBCUTANEOUS at 14:50

## 2020-01-01 RX ADMIN — LORAZEPAM 2 MG: 2 INJECTION INTRAMUSCULAR; INTRAVENOUS at 01:10

## 2020-01-01 RX ADMIN — FOLIC ACID 1 MG: 5 INJECTION, SOLUTION INTRAMUSCULAR; INTRAVENOUS; SUBCUTANEOUS at 08:03

## 2020-01-01 RX ADMIN — INSULIN HUMAN 4 UNITS: 100 INJECTION, SOLUTION PARENTERAL at 05:45

## 2020-01-01 RX ADMIN — INSULIN LISPRO 1 UNITS: 100 INJECTION, SOLUTION INTRAVENOUS; SUBCUTANEOUS at 03:49

## 2020-01-01 RX ADMIN — SODIUM CHLORIDE, PRESERVATIVE FREE 10 ML: 5 INJECTION INTRAVENOUS at 08:10

## 2020-01-01 RX ADMIN — METOPROLOL TARTRATE 2.5 MG: 5 INJECTION, SOLUTION INTRAVENOUS at 00:10

## 2020-01-01 RX ADMIN — CHLORHEXIDINE GLUCONATE 15 ML: 1.2 RINSE ORAL at 08:02

## 2020-01-01 RX ADMIN — CEFTRIAXONE SODIUM 1 G: 1 INJECTION, SOLUTION INTRAVENOUS at 10:29

## 2020-01-01 RX ADMIN — TAZOBACTAM SODIUM AND PIPERACILLIN SODIUM 4.5 G: 500; 4 INJECTION, SOLUTION INTRAVENOUS at 11:25

## 2020-01-01 RX ADMIN — INSULIN LISPRO 1 UNITS: 100 INJECTION, SOLUTION INTRAVENOUS; SUBCUTANEOUS at 03:39

## 2020-01-01 RX ADMIN — PROPOFOL 25 MCG/KG/MIN: 10 INJECTION, EMULSION INTRAVENOUS at 01:17

## 2020-01-01 RX ADMIN — THIAMINE HYDROCHLORIDE 100 MG: 100 INJECTION, SOLUTION INTRAMUSCULAR; INTRAVENOUS at 08:11

## 2020-01-01 RX ADMIN — INSULIN HUMAN 12 UNITS: 100 INJECTION, SOLUTION PARENTERAL at 00:44

## 2020-01-01 RX ADMIN — FENTANYL CITRATE 50 MCG: 0.05 INJECTION, SOLUTION INTRAMUSCULAR; INTRAVENOUS at 00:43

## 2020-01-01 RX ADMIN — FENTANYL CITRATE 100 MCG: 50 INJECTION, SOLUTION INTRAMUSCULAR; INTRAVENOUS at 22:13

## 2020-01-01 RX ADMIN — OCTREOTIDE ACETATE 50 MCG: 50 INJECTION, SOLUTION INTRAVENOUS; SUBCUTANEOUS at 00:36

## 2020-01-01 RX ADMIN — PANTOPRAZOLE SODIUM 40 MG: 40 INJECTION, POWDER, FOR SOLUTION INTRAVENOUS at 08:12

## 2020-01-01 RX ADMIN — TAZOBACTAM SODIUM AND PIPERACILLIN SODIUM 4.5 G: 500; 4 INJECTION, SOLUTION INTRAVENOUS at 20:52

## 2020-01-01 RX ADMIN — FUROSEMIDE 40 MG: 10 INJECTION, SOLUTION INTRAMUSCULAR; INTRAVENOUS at 09:46

## 2020-01-01 RX ADMIN — FOLIC ACID 1 MG: 5 INJECTION, SOLUTION INTRAMUSCULAR; INTRAVENOUS; SUBCUTANEOUS at 08:12

## 2020-01-01 RX ADMIN — POTASSIUM CHLORIDE 10 MEQ: 7.46 INJECTION, SOLUTION INTRAVENOUS at 23:42

## 2020-01-01 RX ADMIN — VANCOMYCIN HYDROCHLORIDE 1000 MG: 1 INJECTION, SOLUTION INTRAVENOUS at 23:54

## 2020-01-01 RX ADMIN — SODIUM CHLORIDE 8 MG/HR: 900 INJECTION INTRAVENOUS at 02:27

## 2020-01-01 RX ADMIN — INSULIN LISPRO 5 UNITS: 100 INJECTION, SOLUTION INTRAVENOUS; SUBCUTANEOUS at 05:09

## 2020-01-01 RX ADMIN — Medication 0.02 MCG/KG/MIN: at 21:36

## 2020-01-01 RX ADMIN — MUPIROCIN: 20 OINTMENT TOPICAL at 14:05

## 2020-01-01 RX ADMIN — METOPROLOL TARTRATE 2.5 MG: 5 INJECTION, SOLUTION INTRAVENOUS at 17:19

## 2020-01-01 RX ADMIN — INSULIN LISPRO 2 UNITS: 100 INJECTION, SOLUTION INTRAVENOUS; SUBCUTANEOUS at 08:13

## 2020-01-01 RX ADMIN — POTASSIUM CHLORIDE 20 MEQ: 400 INJECTION, SOLUTION INTRAVENOUS at 15:00

## 2020-01-01 RX ADMIN — SODIUM CHLORIDE, PRESERVATIVE FREE 10 ML: 5 INJECTION INTRAVENOUS at 20:27

## 2020-01-01 RX ADMIN — INSULIN LISPRO 1 UNITS: 100 INJECTION, SOLUTION INTRAVENOUS; SUBCUTANEOUS at 20:04

## 2020-01-01 RX ADMIN — CHLORHEXIDINE GLUCONATE 15 ML: 1.2 RINSE ORAL at 08:11

## 2020-01-01 RX ADMIN — CHLORHEXIDINE GLUCONATE 15 ML: 1.2 RINSE ORAL at 08:09

## 2020-01-01 RX ADMIN — VANCOMYCIN HYDROCHLORIDE 1000 MG: 1 INJECTION, SOLUTION INTRAVENOUS at 23:05

## 2020-01-01 RX ADMIN — INSULIN LISPRO 4 UNITS: 100 INJECTION, SOLUTION INTRAVENOUS; SUBCUTANEOUS at 20:38

## 2020-01-01 RX ADMIN — ACETYLCYSTEINE 2 ML: 200 SOLUTION ORAL; RESPIRATORY (INHALATION) at 19:34

## 2020-01-01 RX ADMIN — TAZOBACTAM SODIUM AND PIPERACILLIN SODIUM 4.5 G: 500; 4 INJECTION, SOLUTION INTRAVENOUS at 19:55

## 2020-01-01 RX ADMIN — CHLORHEXIDINE GLUCONATE 15 ML: 1.2 RINSE ORAL at 20:41

## 2020-01-01 RX ADMIN — SODIUM CHLORIDE 8 MG/HR: 900 INJECTION INTRAVENOUS at 20:51

## 2020-01-01 RX ADMIN — OCTREOTIDE ACETATE 25 MCG/HR: 500 INJECTION, SOLUTION INTRAVENOUS; SUBCUTANEOUS at 23:54

## 2020-01-01 RX ADMIN — INSULIN LISPRO 1 UNITS: 100 INJECTION, SOLUTION INTRAVENOUS; SUBCUTANEOUS at 08:07

## 2020-01-01 RX ADMIN — PROPOFOL 15 MCG/KG/MIN: 10 INJECTION, EMULSION INTRAVENOUS at 19:40

## 2020-01-01 RX ADMIN — SODIUM CHLORIDE 8 MG/HR: 900 INJECTION INTRAVENOUS at 03:15

## 2020-01-01 RX ADMIN — CHLORHEXIDINE GLUCONATE 15 ML: 1.2 RINSE ORAL at 21:00

## 2020-01-01 RX ADMIN — PROPOFOL 5 MCG/KG/MIN: 10 INJECTION, EMULSION INTRAVENOUS at 02:21

## 2020-01-01 RX ADMIN — METOPROLOL TARTRATE 2.5 MG: 5 INJECTION, SOLUTION INTRAVENOUS at 08:02

## 2020-01-01 RX ADMIN — SODIUM CHLORIDE, PRESERVATIVE FREE 10 ML: 5 INJECTION INTRAVENOUS at 20:02

## 2020-01-01 RX ADMIN — TAZOBACTAM SODIUM AND PIPERACILLIN SODIUM 4.5 G: 500; 4 INJECTION, SOLUTION INTRAVENOUS at 20:04

## 2020-01-01 RX ADMIN — SODIUM CHLORIDE, PRESERVATIVE FREE 10 ML: 5 INJECTION INTRAVENOUS at 20:22

## 2020-01-01 RX ADMIN — FENTANYL CITRATE 100 MCG: 50 INJECTION, SOLUTION INTRAMUSCULAR; INTRAVENOUS at 04:53

## 2020-01-01 RX ADMIN — INSULIN LISPRO 5 UNITS: 100 INJECTION, SOLUTION INTRAVENOUS; SUBCUTANEOUS at 20:40

## 2020-01-01 RX ADMIN — ONDANSETRON 4 MG: 2 INJECTION INTRAMUSCULAR; INTRAVENOUS at 15:42

## 2020-01-01 RX ADMIN — SODIUM CHLORIDE, PRESERVATIVE FREE 10 ML: 5 INJECTION INTRAVENOUS at 20:33

## 2020-01-01 RX ADMIN — THIAMINE HYDROCHLORIDE 100 MG: 100 INJECTION, SOLUTION INTRAMUSCULAR; INTRAVENOUS at 09:28

## 2020-01-01 RX ADMIN — INSULIN LISPRO 2 UNITS: 100 INJECTION, SOLUTION INTRAVENOUS; SUBCUTANEOUS at 11:25

## 2020-01-01 RX ADMIN — SODIUM CHLORIDE, PRESERVATIVE FREE 10 ML: 5 INJECTION INTRAVENOUS at 20:43

## 2020-01-01 RX ADMIN — SODIUM CHLORIDE, PRESERVATIVE FREE 10 ML: 5 INJECTION INTRAVENOUS at 08:02

## 2020-01-01 RX ADMIN — MAGNESIUM SULFATE 2 G: 2 INJECTION INTRAVENOUS at 02:15

## 2020-01-01 RX ADMIN — INSULIN LISPRO 2 UNITS: 100 INJECTION, SOLUTION INTRAVENOUS; SUBCUTANEOUS at 17:54

## 2020-01-01 RX ADMIN — PANTOPRAZOLE SODIUM 40 MG: 40 INJECTION, POWDER, FOR SOLUTION INTRAVENOUS at 20:26

## 2020-01-01 RX ADMIN — INSULIN LISPRO 2 UNITS: 100 INJECTION, SOLUTION INTRAVENOUS; SUBCUTANEOUS at 12:16

## 2020-01-01 RX ADMIN — POTASSIUM CHLORIDE 20 MEQ: 29.8 INJECTION, SOLUTION INTRAVENOUS at 00:10

## 2020-01-01 RX ADMIN — INSULIN LISPRO 2 UNITS: 100 INJECTION, SOLUTION INTRAVENOUS; SUBCUTANEOUS at 12:02

## 2020-01-01 RX ADMIN — THIAMINE HYDROCHLORIDE 100 MG: 100 INJECTION, SOLUTION INTRAMUSCULAR; INTRAVENOUS at 08:02

## 2020-01-01 RX ADMIN — INSULIN HUMAN 8 UNITS: 100 INJECTION, SOLUTION PARENTERAL at 05:49

## 2020-01-01 RX ADMIN — POTASSIUM CHLORIDE 10 MEQ: 7.46 INJECTION, SOLUTION INTRAVENOUS at 10:34

## 2020-01-01 RX ADMIN — FENTANYL CITRATE 50 MCG: 50 INJECTION, SOLUTION INTRAMUSCULAR; INTRAVENOUS at 04:34

## 2020-01-01 RX ADMIN — THIAMINE HYDROCHLORIDE 100 MG: 100 INJECTION, SOLUTION INTRAMUSCULAR; INTRAVENOUS at 20:22

## 2020-01-01 RX ADMIN — VANCOMYCIN HYDROCHLORIDE 1000 MG: 1 INJECTION, SOLUTION INTRAVENOUS at 13:07

## 2020-01-01 RX ADMIN — FENTANYL CITRATE 50 MCG: 50 INJECTION, SOLUTION INTRAMUSCULAR; INTRAVENOUS at 01:18

## 2020-01-01 RX ADMIN — TAZOBACTAM SODIUM AND PIPERACILLIN SODIUM 4.5 G: 500; 4 INJECTION, SOLUTION INTRAVENOUS at 03:48

## 2020-01-01 RX ADMIN — INSULIN HUMAN 8 UNITS: 100 INJECTION, SOLUTION PARENTERAL at 00:00

## 2020-01-01 RX ADMIN — FENTANYL CITRATE 50 MCG: 50 INJECTION, SOLUTION INTRAMUSCULAR; INTRAVENOUS at 17:19

## 2020-01-01 RX ADMIN — INSULIN LISPRO 4 UNITS: 100 INJECTION, SOLUTION INTRAVENOUS; SUBCUTANEOUS at 09:09

## 2020-01-01 RX ADMIN — INSULIN HUMAN 4 UNITS: 100 INJECTION, SOLUTION PARENTERAL at 23:54

## 2020-01-01 RX ADMIN — ETOMIDATE INJECTION 10 MG: 2 SOLUTION INTRAVENOUS at 16:15

## 2020-01-01 RX ADMIN — PANTOPRAZOLE SODIUM 40 MG: 40 INJECTION, POWDER, FOR SOLUTION INTRAVENOUS at 20:38

## 2020-01-01 RX ADMIN — FOLIC ACID 1 MG: 5 INJECTION, SOLUTION INTRAMUSCULAR; INTRAVENOUS; SUBCUTANEOUS at 08:02

## 2020-01-01 RX ADMIN — PHENYLEPHRINE HYDROCHLORIDE 1.2 MCG/KG/MIN: 10 INJECTION INTRAVENOUS at 20:33

## 2020-01-01 RX ADMIN — OCTREOTIDE ACETATE 25 MCG/HR: 500 INJECTION, SOLUTION INTRAVENOUS; SUBCUTANEOUS at 00:36

## 2020-01-01 RX ADMIN — METOPROLOL TARTRATE 2.5 MG: 5 INJECTION, SOLUTION INTRAVENOUS at 16:20

## 2020-01-01 RX ADMIN — SODIUM CHLORIDE, PRESERVATIVE FREE 10 ML: 5 INJECTION INTRAVENOUS at 01:51

## 2020-01-01 RX ADMIN — VASOPRESSIN 0.03 UNITS/MIN: 20 INJECTION INTRAVENOUS at 22:37

## 2020-01-01 RX ADMIN — METOPROLOL TARTRATE 2.5 MG: 5 INJECTION, SOLUTION INTRAVENOUS at 09:06

## 2020-01-01 RX ADMIN — PROPOFOL 20 MCG/KG/MIN: 10 INJECTION, EMULSION INTRAVENOUS at 13:27

## 2020-01-01 RX ADMIN — SODIUM CHLORIDE, PRESERVATIVE FREE 10 ML: 5 INJECTION INTRAVENOUS at 20:04

## 2020-01-01 RX ADMIN — METOPROLOL TARTRATE 2.5 MG: 5 INJECTION, SOLUTION INTRAVENOUS at 00:15

## 2020-01-01 RX ADMIN — POTASSIUM CHLORIDE 20 MEQ: 29.8 INJECTION, SOLUTION INTRAVENOUS at 07:05

## 2020-01-01 RX ADMIN — EPINEPHRINE 0.02 MCG/KG/MIN: 1 INJECTION INTRAMUSCULAR; INTRAVENOUS; SUBCUTANEOUS at 00:02

## 2020-01-01 RX ADMIN — SODIUM CHLORIDE, PRESERVATIVE FREE 10 ML: 5 INJECTION INTRAVENOUS at 20:35

## 2020-01-01 RX ADMIN — METOPROLOL TARTRATE 2.5 MG: 5 INJECTION, SOLUTION INTRAVENOUS at 16:42

## 2020-01-01 RX ADMIN — INSULIN HUMAN 8 UNITS: 100 INJECTION, SOLUTION PARENTERAL at 04:00

## 2020-01-01 RX ADMIN — INSULIN HUMAN 8 UNITS: 100 INJECTION, SOLUTION PARENTERAL at 16:20

## 2020-01-01 RX ADMIN — TAZOBACTAM SODIUM AND PIPERACILLIN SODIUM 4.5 G: 500; 4 INJECTION, SOLUTION INTRAVENOUS at 03:37

## 2020-01-01 RX ADMIN — CHLORHEXIDINE GLUCONATE 15 ML: 1.2 RINSE ORAL at 09:06

## 2020-01-01 RX ADMIN — THIAMINE HYDROCHLORIDE 100 MG: 100 INJECTION, SOLUTION INTRAMUSCULAR; INTRAVENOUS at 09:03

## 2020-01-01 RX ADMIN — SODIUM BICARBONATE 150 MEQ/1,000 ML IN DEXTROSE 5 % INTRAVENOUS 150 MEQ: SOLUTION at 20:15

## 2020-01-01 RX ADMIN — PHENYLEPHRINE HYDROCHLORIDE 1 MCG/KG/MIN: 10 INJECTION INTRAVENOUS at 22:55

## 2020-01-01 RX ADMIN — OCTREOTIDE ACETATE 25 MCG/HR: 500 INJECTION, SOLUTION INTRAVENOUS; SUBCUTANEOUS at 18:07

## 2020-01-01 RX ADMIN — SODIUM CHLORIDE 8 MG/HR: 900 INJECTION INTRAVENOUS at 03:19

## 2020-01-01 RX ADMIN — METOPROLOL TARTRATE 2.5 MG: 5 INJECTION, SOLUTION INTRAVENOUS at 08:01

## 2020-01-01 RX ADMIN — Medication 10 ML: at 20:56

## 2020-01-01 RX ADMIN — INSULIN LISPRO 5 UNITS: 100 INJECTION, SOLUTION INTRAVENOUS; SUBCUTANEOUS at 04:58

## 2020-01-01 RX ADMIN — INSULIN HUMAN 4 UNITS: 100 INJECTION, SOLUTION PARENTERAL at 20:04

## 2020-01-01 RX ADMIN — INSULIN HUMAN 8 UNITS: 100 INJECTION, SOLUTION PARENTERAL at 13:07

## 2020-01-01 RX ADMIN — METOPROLOL TARTRATE 2.5 MG: 5 INJECTION, SOLUTION INTRAVENOUS at 16:39

## 2020-01-01 RX ADMIN — INSULIN HUMAN 12 UNITS: 100 INJECTION, SOLUTION PARENTERAL at 17:38

## 2020-01-01 RX ADMIN — OCTREOTIDE ACETATE 25 MCG/HR: 500 INJECTION, SOLUTION INTRAVENOUS; SUBCUTANEOUS at 13:01

## 2020-01-01 RX ADMIN — INSULIN LISPRO 6 UNITS: 100 INJECTION, SOLUTION INTRAVENOUS; SUBCUTANEOUS at 14:06

## 2020-01-01 RX ADMIN — CEFTRIAXONE SODIUM 1 G: 1 INJECTION, SOLUTION INTRAVENOUS at 11:24

## 2020-01-01 RX ADMIN — INSULIN HUMAN 8 UNITS: 100 INJECTION, SOLUTION PARENTERAL at 23:49

## 2020-01-01 RX ADMIN — CHLORHEXIDINE GLUCONATE 15 ML: 1.2 RINSE ORAL at 20:05

## 2020-01-01 RX ADMIN — SODIUM CHLORIDE, PRESERVATIVE FREE 10 ML: 5 INJECTION INTRAVENOUS at 20:07

## 2020-01-01 RX ADMIN — MUPIROCIN: 20 OINTMENT TOPICAL at 20:26

## 2020-01-01 RX ADMIN — I.V. FAT EMULSION 20 G: 20 EMULSION INTRAVENOUS at 18:29

## 2020-01-01 RX ADMIN — LORAZEPAM 2 MG: 2 INJECTION INTRAMUSCULAR at 01:10

## 2020-01-01 RX ADMIN — CEFTRIAXONE SODIUM 1 G: 1 INJECTION, SOLUTION INTRAVENOUS at 08:12

## 2020-01-01 RX ADMIN — CHLORHEXIDINE GLUCONATE 15 ML: 1.2 RINSE ORAL at 09:03

## 2020-01-01 RX ADMIN — METOPROLOL TARTRATE 2.5 MG: 5 INJECTION, SOLUTION INTRAVENOUS at 16:15

## 2020-01-01 RX ADMIN — SODIUM CHLORIDE, PRESERVATIVE FREE 10 ML: 5 INJECTION INTRAVENOUS at 20:53

## 2020-01-01 RX ADMIN — INSULIN HUMAN 8 UNITS: 100 INJECTION, SOLUTION PARENTERAL at 20:36

## 2020-01-01 RX ADMIN — SODIUM CHLORIDE 8 MG/HR: 900 INJECTION INTRAVENOUS at 09:21

## 2020-01-01 RX ADMIN — SODIUM CHLORIDE 8 MG/HR: 900 INJECTION INTRAVENOUS at 12:02

## 2020-01-01 RX ADMIN — I.V. FAT EMULSION 20 G: 20 EMULSION INTRAVENOUS at 17:32

## 2020-01-01 RX ADMIN — TAZOBACTAM SODIUM AND PIPERACILLIN SODIUM 4.5 G: 500; 4 INJECTION, SOLUTION INTRAVENOUS at 19:50

## 2020-01-01 RX ADMIN — SODIUM CHLORIDE 8 MG/HR: 900 INJECTION INTRAVENOUS at 17:19

## 2020-01-01 RX ADMIN — THIAMINE HYDROCHLORIDE 100 MG: 100 INJECTION, SOLUTION INTRAMUSCULAR; INTRAVENOUS at 03:18

## 2020-01-01 RX ADMIN — MUPIROCIN: 20 OINTMENT TOPICAL at 20:04

## 2020-01-01 RX ADMIN — METOPROLOL TARTRATE 2.5 MG: 5 INJECTION, SOLUTION INTRAVENOUS at 08:07

## 2020-01-01 RX ADMIN — SODIUM CHLORIDE 8 MG/HR: 900 INJECTION INTRAVENOUS at 13:40

## 2020-01-01 RX ADMIN — INSULIN LISPRO 2 UNITS: 100 INJECTION, SOLUTION INTRAVENOUS; SUBCUTANEOUS at 20:02

## 2020-01-01 RX ADMIN — CHLORHEXIDINE GLUCONATE 15 ML: 1.2 RINSE ORAL at 20:02

## 2020-01-01 RX ADMIN — FOLIC ACID 1 MG: 5 INJECTION, SOLUTION INTRAMUSCULAR; INTRAVENOUS; SUBCUTANEOUS at 09:03

## 2020-01-01 RX ADMIN — CEFTRIAXONE SODIUM 1 G: 1 INJECTION, SOLUTION INTRAVENOUS at 09:03

## 2020-01-01 RX ADMIN — INSULIN LISPRO 2 UNITS: 100 INJECTION, SOLUTION INTRAVENOUS; SUBCUTANEOUS at 17:18

## 2020-01-01 RX ADMIN — INSULIN LISPRO 3 UNITS: 100 INJECTION, SOLUTION INTRAVENOUS; SUBCUTANEOUS at 16:12

## 2020-01-01 RX ADMIN — LORAZEPAM 1 MG: 2 INJECTION INTRAMUSCULAR; INTRAVENOUS at 21:32

## 2020-01-01 RX ADMIN — ALBUTEROL SULFATE 6 PUFF: 90 AEROSOL, METERED RESPIRATORY (INHALATION) at 15:00

## 2020-01-01 RX ADMIN — SODIUM CHLORIDE, PRESERVATIVE FREE 10 ML: 5 INJECTION INTRAVENOUS at 09:32

## 2020-01-01 RX ADMIN — PANTOPRAZOLE SODIUM 40 MG: 40 INJECTION, POWDER, FOR SOLUTION INTRAVENOUS at 09:02

## 2020-01-01 RX ADMIN — METOPROLOL TARTRATE 2.5 MG: 5 INJECTION, SOLUTION INTRAVENOUS at 09:31

## 2020-01-01 RX ADMIN — FENTANYL CITRATE 50 MCG: 50 INJECTION, SOLUTION INTRAMUSCULAR; INTRAVENOUS at 09:08

## 2020-01-01 RX ADMIN — CHLORHEXIDINE GLUCONATE 15 ML: 1.2 RINSE ORAL at 08:12

## 2020-01-01 RX ADMIN — PANTOPRAZOLE SODIUM 40 MG: 40 INJECTION, POWDER, FOR SOLUTION INTRAVENOUS at 20:52

## 2020-01-01 RX ADMIN — TAZOBACTAM SODIUM AND PIPERACILLIN SODIUM 4.5 G: 500; 4 INJECTION, SOLUTION INTRAVENOUS at 12:16

## 2020-01-01 RX ADMIN — OCTREOTIDE ACETATE 25 MCG/HR: 500 INJECTION, SOLUTION INTRAVENOUS; SUBCUTANEOUS at 10:09

## 2020-01-01 RX ADMIN — LIDOCAINE HYDROCHLORIDE 10 ML: 10 INJECTION, SOLUTION INFILTRATION; PERINEURAL at 17:07

## 2020-01-01 RX ADMIN — SODIUM CHLORIDE, PRESERVATIVE FREE 10 ML: 5 INJECTION INTRAVENOUS at 20:06

## 2020-01-01 RX ADMIN — METOPROLOL TARTRATE 2.5 MG: 5 INJECTION, SOLUTION INTRAVENOUS at 00:22

## 2020-01-01 RX ADMIN — INSULIN HUMAN 4 UNITS: 100 INJECTION, SOLUTION PARENTERAL at 04:27

## 2020-01-01 RX ADMIN — INSULIN LISPRO 7 UNITS: 100 INJECTION, SOLUTION INTRAVENOUS; SUBCUTANEOUS at 23:37

## 2020-01-01 RX ADMIN — LORAZEPAM 1 MG: 2 INJECTION INTRAMUSCULAR; INTRAVENOUS at 00:08

## 2020-01-01 RX ADMIN — INSULIN LISPRO 2 UNITS: 100 INJECTION, SOLUTION INTRAVENOUS; SUBCUTANEOUS at 09:06

## 2020-01-01 RX ADMIN — INSULIN LISPRO 2 UNITS: 100 INJECTION, SOLUTION INTRAVENOUS; SUBCUTANEOUS at 08:21

## 2020-01-01 RX ADMIN — Medication 0.2 MCG/KG/MIN: at 19:28

## 2020-01-01 RX ADMIN — PROPOFOL 20 MCG/KG/MIN: 10 INJECTION, EMULSION INTRAVENOUS at 12:44

## 2020-01-01 RX ADMIN — INSULIN HUMAN 8 UNITS: 100 INJECTION, SOLUTION PARENTERAL at 16:36

## 2020-01-01 RX ADMIN — METOPROLOL TARTRATE 2.5 MG: 5 INJECTION, SOLUTION INTRAVENOUS at 07:49

## 2020-01-01 RX ADMIN — PHENYLEPHRINE HYDROCHLORIDE 3 MCG/KG/MIN: 10 INJECTION INTRAVENOUS at 16:15

## 2020-01-01 RX ADMIN — SODIUM CHLORIDE, PRESERVATIVE FREE 10 ML: 5 INJECTION INTRAVENOUS at 14:11

## 2020-01-01 RX ADMIN — MORPHINE SULFATE 1 MG: 2 INJECTION, SOLUTION INTRAMUSCULAR; INTRAVENOUS at 10:51

## 2020-01-01 RX ADMIN — PANTOPRAZOLE SODIUM 40 MG: 40 INJECTION, POWDER, FOR SOLUTION INTRAVENOUS at 20:22

## 2020-01-01 RX ADMIN — INSULIN HUMAN 12 UNITS: 100 INJECTION, SOLUTION PARENTERAL at 08:34

## 2020-01-01 RX ADMIN — CHLORHEXIDINE GLUCONATE 15 ML: 1.2 RINSE ORAL at 20:09

## 2020-01-01 RX ADMIN — TAZOBACTAM SODIUM AND PIPERACILLIN SODIUM 4.5 G: 500; 4 INJECTION, SOLUTION INTRAVENOUS at 11:52

## 2020-01-01 RX ADMIN — FOLIC ACID 1 MG: 5 INJECTION, SOLUTION INTRAMUSCULAR; INTRAVENOUS; SUBCUTANEOUS at 20:28

## 2020-01-01 RX ADMIN — CHLORHEXIDINE GLUCONATE 15 ML: 1.2 RINSE ORAL at 08:07

## 2020-01-01 RX ADMIN — INSULIN LISPRO 4 UNITS: 100 INJECTION, SOLUTION INTRAVENOUS; SUBCUTANEOUS at 19:55

## 2020-01-01 RX ADMIN — MUPIROCIN: 20 OINTMENT TOPICAL at 08:38

## 2020-01-01 RX ADMIN — INSULIN HUMAN 8 UNITS: 100 INJECTION, SOLUTION PARENTERAL at 09:02

## 2020-01-01 RX ADMIN — FENTANYL CITRATE 50 MCG: 0.05 INJECTION, SOLUTION INTRAMUSCULAR; INTRAVENOUS at 10:59

## 2020-01-01 RX ADMIN — INSULIN LISPRO 8 UNITS: 100 INJECTION, SOLUTION INTRAVENOUS; SUBCUTANEOUS at 02:10

## 2020-01-01 RX ADMIN — METOPROLOL TARTRATE 2.5 MG: 5 INJECTION, SOLUTION INTRAVENOUS at 17:37

## 2020-01-01 RX ADMIN — MUPIROCIN: 20 OINTMENT TOPICAL at 20:41

## 2020-01-01 RX ADMIN — INSULIN LISPRO 3 UNITS: 100 INJECTION, SOLUTION INTRAVENOUS; SUBCUTANEOUS at 16:05

## 2020-01-01 RX ADMIN — SODIUM CHLORIDE, PRESERVATIVE FREE 10 ML: 5 INJECTION INTRAVENOUS at 08:08

## 2020-01-01 RX ADMIN — INSULIN HUMAN 8 UNITS: 100 INJECTION, SOLUTION PARENTERAL at 16:15

## 2020-01-01 RX ADMIN — POTASSIUM CHLORIDE 10 MEQ: 7.46 INJECTION, SOLUTION INTRAVENOUS at 06:09

## 2020-01-01 RX ADMIN — TAZOBACTAM SODIUM AND PIPERACILLIN SODIUM 4.5 G: 500; 4 INJECTION, SOLUTION INTRAVENOUS at 12:13

## 2020-01-01 RX ADMIN — POTASSIUM CHLORIDE 20 MEQ: 29.8 INJECTION, SOLUTION INTRAVENOUS at 17:06

## 2020-01-01 RX ADMIN — MUPIROCIN: 20 OINTMENT TOPICAL at 08:03

## 2020-01-01 RX ADMIN — POTASSIUM CHLORIDE 20 MEQ: 29.8 INJECTION, SOLUTION INTRAVENOUS at 02:11

## 2020-01-01 RX ADMIN — POTASSIUM PHOSPHATE, MONOBASIC AND POTASSIUM PHOSPHATE, DIBASIC 15 MMOL: 224; 236 INJECTION, SOLUTION, CONCENTRATE INTRAVENOUS at 16:15

## 2020-01-01 RX ADMIN — OCTREOTIDE ACETATE 25 MCG/HR: 500 INJECTION, SOLUTION INTRAVENOUS; SUBCUTANEOUS at 13:42

## 2020-01-01 RX ADMIN — ACETYLCYSTEINE 2 ML: 200 SOLUTION ORAL; RESPIRATORY (INHALATION) at 15:00

## 2020-01-01 RX ADMIN — POTASSIUM CHLORIDE 10 MEQ: 7.46 INJECTION, SOLUTION INTRAVENOUS at 08:51

## 2020-01-01 RX ADMIN — INSULIN HUMAN 8 UNITS: 100 INJECTION, SOLUTION PARENTERAL at 05:00

## 2020-01-01 RX ADMIN — TAZOBACTAM SODIUM AND PIPERACILLIN SODIUM 4.5 G: 500; 4 INJECTION, SOLUTION INTRAVENOUS at 12:55

## 2020-01-01 RX ADMIN — SODIUM BICARBONATE 150 MEQ/1,000 ML IN DEXTROSE 5 % INTRAVENOUS 150 MEQ: SOLUTION at 05:06

## 2020-01-01 RX ADMIN — SODIUM CHLORIDE, POTASSIUM CHLORIDE, SODIUM LACTATE AND CALCIUM CHLORIDE 125 ML/HR: 600; 310; 30; 20 INJECTION, SOLUTION INTRAVENOUS at 09:24

## 2020-01-01 RX ADMIN — FUROSEMIDE 40 MG: 10 INJECTION, SOLUTION INTRAMUSCULAR; INTRAVENOUS at 20:56

## 2020-01-01 RX ADMIN — LORAZEPAM 0.5 MG: 2 INJECTION INTRAMUSCULAR; INTRAVENOUS at 02:45

## 2020-01-01 RX ADMIN — OCTREOTIDE ACETATE 25 MCG/HR: 500 INJECTION, SOLUTION INTRAVENOUS; SUBCUTANEOUS at 04:18

## 2020-01-01 RX ADMIN — FUROSEMIDE 40 MG: 10 INJECTION, SOLUTION INTRAMUSCULAR; INTRAVENOUS at 12:11

## 2020-01-01 RX ADMIN — PANTOPRAZOLE SODIUM 40 MG: 40 INJECTION, POWDER, FOR SOLUTION INTRAVENOUS at 20:41

## 2020-01-01 RX ADMIN — SODIUM CHLORIDE, PRESERVATIVE FREE 10 ML: 5 INJECTION INTRAVENOUS at 09:24

## 2020-01-01 RX ADMIN — METOPROLOL TARTRATE 2.5 MG: 5 INJECTION, SOLUTION INTRAVENOUS at 16:25

## 2020-01-01 RX ADMIN — SODIUM CHLORIDE, PRESERVATIVE FREE 10 ML: 5 INJECTION INTRAVENOUS at 09:47

## 2020-01-01 RX ADMIN — INSULIN LISPRO 5 UNITS: 100 INJECTION, SOLUTION INTRAVENOUS; SUBCUTANEOUS at 00:28

## 2020-01-01 RX ADMIN — TAZOBACTAM SODIUM AND PIPERACILLIN SODIUM 4.5 G: 500; 4 INJECTION, SOLUTION INTRAVENOUS at 03:18

## 2020-01-01 RX ADMIN — INSULIN LISPRO 2 UNITS: 100 INJECTION, SOLUTION INTRAVENOUS; SUBCUTANEOUS at 20:12

## 2020-01-01 RX ADMIN — SODIUM CHLORIDE 8 MG/HR: 900 INJECTION INTRAVENOUS at 09:41

## 2020-01-01 RX ADMIN — SODIUM CHLORIDE, PRESERVATIVE FREE 10 ML: 5 INJECTION INTRAVENOUS at 20:54

## 2020-01-01 RX ADMIN — POTASSIUM CHLORIDE 20 MEQ: 29.8 INJECTION, SOLUTION INTRAVENOUS at 13:55

## 2020-01-01 RX ADMIN — THIAMINE HYDROCHLORIDE 100 MG: 100 INJECTION, SOLUTION INTRAMUSCULAR; INTRAVENOUS at 09:42

## 2020-01-01 RX ADMIN — METOPROLOL TARTRATE 2.5 MG: 5 INJECTION, SOLUTION INTRAVENOUS at 07:59

## 2020-01-01 RX ADMIN — MORPHINE SULFATE 2 MG: 2 INJECTION, SOLUTION INTRAMUSCULAR; INTRAVENOUS at 23:40

## 2020-01-01 RX ADMIN — THIAMINE HYDROCHLORIDE 100 MG: 100 INJECTION, SOLUTION INTRAMUSCULAR; INTRAVENOUS at 13:50

## 2020-01-01 RX ADMIN — POTASSIUM CHLORIDE 20 MEQ: 29.8 INJECTION, SOLUTION INTRAVENOUS at 06:03

## 2020-01-01 RX ADMIN — PROPOFOL 5 MCG/KG/MIN: 10 INJECTION, EMULSION INTRAVENOUS at 21:10

## 2020-01-01 RX ADMIN — POTASSIUM CHLORIDE: 2 INJECTION, SOLUTION, CONCENTRATE INTRAVENOUS at 17:32

## 2020-01-01 RX ADMIN — PANTOPRAZOLE SODIUM 40 MG: 40 INJECTION, POWDER, FOR SOLUTION INTRAVENOUS at 20:33

## 2020-01-01 RX ADMIN — METOPROLOL TARTRATE 2.5 MG: 5 INJECTION, SOLUTION INTRAVENOUS at 16:45

## 2020-01-01 RX ADMIN — INSULIN HUMAN 8 UNITS: 100 INJECTION, SOLUTION PARENTERAL at 20:52

## 2020-01-01 RX ADMIN — SODIUM CHLORIDE 1000 ML: 9 INJECTION, SOLUTION INTRAVENOUS at 01:50

## 2020-01-01 RX ADMIN — TAZOBACTAM SODIUM AND PIPERACILLIN SODIUM 4.5 G: 500; 4 INJECTION, SOLUTION INTRAVENOUS at 03:39

## 2020-01-01 RX ADMIN — PANTOPRAZOLE SODIUM 40 MG: 40 INJECTION, POWDER, FOR SOLUTION INTRAVENOUS at 08:21

## 2020-01-01 RX ADMIN — METOPROLOL TARTRATE 2.5 MG: 5 INJECTION, SOLUTION INTRAVENOUS at 16:05

## 2020-01-01 RX ADMIN — VASOPRESSIN 0.03 UNITS/MIN: 20 INJECTION INTRAVENOUS at 16:30

## 2020-01-01 RX ADMIN — CEFTRIAXONE SODIUM 1 G: 1 INJECTION, SOLUTION INTRAVENOUS at 09:50

## 2020-01-01 RX ADMIN — PANTOPRAZOLE SODIUM 40 MG: 40 INJECTION, POWDER, FOR SOLUTION INTRAVENOUS at 08:02

## 2020-01-01 RX ADMIN — TAZOBACTAM SODIUM AND PIPERACILLIN SODIUM 4.5 G: 500; 4 INJECTION, SOLUTION INTRAVENOUS at 22:48

## 2020-01-01 RX ADMIN — PANTOPRAZOLE SODIUM 80 MG: 40 INJECTION, POWDER, FOR SOLUTION INTRAVENOUS at 23:17

## 2020-01-01 RX ADMIN — METOROPROLOL TARTRATE 2.5 MG: 5 INJECTION, SOLUTION INTRAVENOUS at 14:30

## 2020-01-01 RX ADMIN — EPINEPHRINE 0.3 MCG/KG/MIN: 1 INJECTION PARENTERAL at 17:15

## 2020-01-01 RX ADMIN — FENTANYL CITRATE 50 MCG: 50 INJECTION, SOLUTION INTRAMUSCULAR; INTRAVENOUS at 19:34

## 2020-01-01 RX ADMIN — SODIUM BICARBONATE 100 MEQ: 84 INJECTION, SOLUTION INTRAVENOUS at 16:44

## 2020-01-01 RX ADMIN — POTASSIUM CHLORIDE 20 MEQ: 29.8 INJECTION, SOLUTION INTRAVENOUS at 17:29

## 2020-01-01 RX ADMIN — CHLORHEXIDINE GLUCONATE 15 ML: 1.2 RINSE ORAL at 20:53

## 2020-01-01 RX ADMIN — CEFTRIAXONE SODIUM 1 G: 1 INJECTION, SOLUTION INTRAVENOUS at 04:10

## 2020-01-01 RX ADMIN — METOPROLOL TARTRATE 2.5 MG: 5 INJECTION, SOLUTION INTRAVENOUS at 07:42

## 2020-01-01 RX ADMIN — INSULIN HUMAN 8 UNITS: 100 INJECTION, SOLUTION PARENTERAL at 13:24

## 2020-01-01 RX ADMIN — MORPHINE SULFATE 1 MG: 2 INJECTION, SOLUTION INTRAMUSCULAR; INTRAVENOUS at 02:16

## 2020-01-01 RX ADMIN — POTASSIUM CHLORIDE 10 MEQ: 7.46 INJECTION, SOLUTION INTRAVENOUS at 00:43

## 2020-01-01 RX ADMIN — INSULIN LISPRO 2 UNITS: 100 INJECTION, SOLUTION INTRAVENOUS; SUBCUTANEOUS at 17:09

## 2020-01-01 RX ADMIN — SODIUM CHLORIDE 8 MG/HR: 900 INJECTION INTRAVENOUS at 22:45

## 2020-01-01 RX ADMIN — TAZOBACTAM SODIUM AND PIPERACILLIN SODIUM 4.5 G: 500; 4 INJECTION, SOLUTION INTRAVENOUS at 03:56

## 2020-01-01 RX ADMIN — INSULIN HUMAN 0 UNITS: 100 INJECTION, SOLUTION PARENTERAL at 09:55

## 2020-01-01 RX ADMIN — INSULIN HUMAN 8 UNITS: 100 INJECTION, SOLUTION PARENTERAL at 17:11

## 2020-01-01 RX ADMIN — POTASSIUM CHLORIDE 10 MEQ: 7.46 INJECTION, SOLUTION INTRAVENOUS at 14:58

## 2020-01-01 RX ADMIN — INSULIN LISPRO 4 UNITS: 100 INJECTION, SOLUTION INTRAVENOUS; SUBCUTANEOUS at 00:32

## 2020-01-01 RX ADMIN — LIDOCAINE HYDROCHLORIDE 20 ML: 10 INJECTION, SOLUTION INFILTRATION; PERINEURAL at 14:20

## 2020-01-01 RX ADMIN — SODIUM CHLORIDE 8 MG/HR: 900 INJECTION INTRAVENOUS at 14:31

## 2020-01-01 RX ADMIN — INSULIN LISPRO 3 UNITS: 100 INJECTION, SOLUTION INTRAVENOUS; SUBCUTANEOUS at 00:22

## 2020-01-01 RX ADMIN — SODIUM CHLORIDE 8 MG/HR: 900 INJECTION INTRAVENOUS at 21:16

## 2020-01-01 RX ADMIN — OCTREOTIDE ACETATE 25 MCG/HR: 500 INJECTION, SOLUTION INTRAVENOUS; SUBCUTANEOUS at 10:04

## 2020-01-01 RX ADMIN — INSULIN LISPRO 2 UNITS: 100 INJECTION, SOLUTION INTRAVENOUS; SUBCUTANEOUS at 03:56

## 2020-01-01 RX ADMIN — SODIUM CHLORIDE 8 MG/HR: 900 INJECTION INTRAVENOUS at 05:06

## 2020-01-01 RX ADMIN — TAZOBACTAM SODIUM AND PIPERACILLIN SODIUM 4.5 G: 500; 4 INJECTION, SOLUTION INTRAVENOUS at 03:36

## 2020-01-01 RX ADMIN — SODIUM CHLORIDE 8 MG/HR: 900 INJECTION INTRAVENOUS at 13:42

## 2020-01-01 RX ADMIN — TAZOBACTAM SODIUM AND PIPERACILLIN SODIUM 4.5 G: 500; 4 INJECTION, SOLUTION INTRAVENOUS at 12:02

## 2020-01-01 RX ADMIN — SODIUM CHLORIDE, PRESERVATIVE FREE 10 ML: 5 INJECTION INTRAVENOUS at 09:31

## 2020-01-01 RX ADMIN — SODIUM CHLORIDE 8 MG/HR: 900 INJECTION INTRAVENOUS at 19:31

## 2020-01-01 RX ADMIN — INSULIN LISPRO 2 UNITS: 100 INJECTION, SOLUTION INTRAVENOUS; SUBCUTANEOUS at 13:49

## 2020-01-01 RX ADMIN — VANCOMYCIN HYDROCHLORIDE 1750 MG: 10 INJECTION, POWDER, LYOPHILIZED, FOR SOLUTION INTRAVENOUS at 11:11

## 2020-01-01 RX ADMIN — INSULIN LISPRO 3 UNITS: 100 INJECTION, SOLUTION INTRAVENOUS; SUBCUTANEOUS at 20:36

## 2020-01-01 RX ADMIN — I.V. FAT EMULSION 20 G: 20 EMULSION INTRAVENOUS at 18:28

## 2020-01-01 RX ADMIN — OCTREOTIDE ACETATE 25 MCG/HR: 500 INJECTION, SOLUTION INTRAVENOUS; SUBCUTANEOUS at 12:12

## 2020-01-01 RX ADMIN — TAZOBACTAM SODIUM AND PIPERACILLIN SODIUM 4.5 G: 500; 4 INJECTION, SOLUTION INTRAVENOUS at 14:06

## 2020-01-01 RX ADMIN — MAGNESIUM SULFATE 2 G: 2 INJECTION INTRAVENOUS at 05:13

## 2020-01-01 RX ADMIN — METOPROLOL TARTRATE 2.5 MG: 5 INJECTION, SOLUTION INTRAVENOUS at 23:31

## 2020-01-01 RX ADMIN — ALBUMIN HUMAN 25 G: 0.25 SOLUTION INTRAVENOUS at 11:23

## 2020-01-01 RX ADMIN — INSULIN LISPRO 3 UNITS: 100 INJECTION, SOLUTION INTRAVENOUS; SUBCUTANEOUS at 03:49

## 2020-01-01 RX ADMIN — INSULIN HUMAN 4 UNITS: 100 INJECTION, SOLUTION PARENTERAL at 20:32

## 2020-01-01 RX ADMIN — LORAZEPAM 0.5 MG: 2 INJECTION INTRAMUSCULAR; INTRAVENOUS at 10:01

## 2020-01-01 RX ADMIN — ALTEPLASE: 2.2 INJECTION, POWDER, LYOPHILIZED, FOR SOLUTION INTRAVENOUS at 12:36

## 2020-01-01 RX ADMIN — METOPROLOL TARTRATE 2.5 MG: 5 INJECTION, SOLUTION INTRAVENOUS at 00:55

## 2020-01-01 RX ADMIN — ALBUTEROL SULFATE 6 PUFF: 90 AEROSOL, METERED RESPIRATORY (INHALATION) at 19:34

## 2020-01-01 RX ADMIN — SODIUM CHLORIDE, PRESERVATIVE FREE 10 ML: 5 INJECTION INTRAVENOUS at 20:10

## 2020-01-01 RX ADMIN — CALCIUM GLUCONATE: 94 INJECTION, SOLUTION INTRAVENOUS at 19:17

## 2020-01-01 RX ADMIN — POTASSIUM CHLORIDE 20 MEQ: 29.8 INJECTION, SOLUTION INTRAVENOUS at 16:00

## 2020-01-01 RX ADMIN — INSULIN HUMAN 7 UNITS: 100 INJECTION, SOLUTION PARENTERAL at 08:16

## 2020-01-01 RX ADMIN — SODIUM CHLORIDE 8 MG/HR: 900 INJECTION INTRAVENOUS at 23:17

## 2020-01-01 RX ADMIN — MUPIROCIN: 20 OINTMENT TOPICAL at 20:34

## 2020-10-04 PROBLEM — K92.2 ACUTE UPPER GASTROINTESTINAL BLEEDING: Status: ACTIVE | Noted: 2020-01-01

## 2020-10-05 PROBLEM — K92.2 UPPER GI BLEED: Status: ACTIVE | Noted: 2020-01-01

## 2020-10-06 NOTE — ANESTHESIA POSTPROCEDURE EVALUATION
Patient: Jese Lopez    Procedure Summary     Date: 10/06/20 Room / Location:  [facility]    Anesthesia Start: 1326 Anesthesia Stop: 1339    Procedure: ESOPHAGOGASTRODUODENOSCOPY AT BEDSIDE (N/A Esophagus) Diagnosis:       Upper GI bleed      (Upper GI bleed [K92.2])    Surgeon: Melecio Medrano MD Provider: Adelso Freeman MD    Anesthesia Type: MAC ASA Status: 4          Anesthesia Type: MAC    Vitals  Vitals Value Taken Time   /67 10/06/20 1336   Temp 37.9 °C (100.2 °F) 10/06/20 1337   Pulse 128 10/06/20 1327   Resp 33 10/06/20 1338   SpO2 94 % 10/06/20 1337   Vitals shown include unvalidated device data.        Post Anesthesia Care and Evaluation    Patient location during evaluation: ICU  Patient participation: complete - patient cannot participate  Level of consciousness: obtunded/minimal responses  Pain score: 0  Airway patency: patent  PONV Status: none  Cardiovascular status: acceptable  Respiratory status: ventilator  Hydration status: acceptable

## 2020-10-06 NOTE — ANESTHESIA PREPROCEDURE EVALUATION
Anesthesia Evaluation     Patient summary reviewed and Nursing notes reviewed                Airway   Mallampati: I  TM distance: >3 FB  Neck ROM: full  No difficulty expected  Dental - normal exam     Pulmonary - normal exam   (+) a smoker Current,   Cardiovascular - negative cardio ROS and normal exam        Neuro/Psych  (+) psychiatric history,     GI/Hepatic/Renal/Endo    (+)  GERD, GI bleeding , hepatitis, liver disease, diabetes mellitus,     Musculoskeletal (-) negative ROS    Abdominal  - normal exam    Bowel sounds: normal.   Substance History - negative use     OB/GYN negative ob/gyn ROS         Other                        Anesthesia Plan    ASA 4     MAC       Anesthetic plan, all risks, benefits, and alternatives have been provided, discussed and informed consent has been obtained with: patient.

## 2020-10-19 LAB
BACTERIA SPEC RESP CULT: ABNORMAL
BACTERIA SPEC RESP CULT: ABNORMAL
GRAM STN SPEC: ABNORMAL

## 2020-10-21 LAB — BACTERIA SPEC AEROBE CULT: NORMAL

## 2020-10-22 LAB
BACTERIA FLD CULT: NORMAL
BACTERIA SPEC ANAEROBE CULT: NORMAL
GRAM STN SPEC: NORMAL
GRAM STN SPEC: NORMAL

## 2020-10-23 LAB
BACTERIA SPEC AEROBE CULT: NORMAL
BACTERIA SPEC AEROBE CULT: NORMAL

## 2020-11-02 NOTE — DISCHARGE SUMMARY
[facility]    Admit date: 10/4/2020  Discharge date: 10/17/2020    Admission/discharge diagnosis:  Acute resp failure, mechanical ventilation  Hypovolemic shock,  Acute metabolic lactic acidosis  Acute upper GI bleed secondary to esophageal varices requiring [place] for stabilization and banding x7  ABLA  Alcohol abuse  Risk of DT's  Suspected cirrhosis  Hepatic coagulopathy  Lip laceration  Chronic Hep C  Chronic narcotic dependence  Chronic neck and back pain  PTSD  DM2  Weakness/immobility  Right groin wound  TPN    HPI: reviewed as per Dr. Blackwell:  Patient is 65-year-old white male [employer]  [facility] and does have a diagnosis of hepatitis C, PTSD, diabetes who was known alcoholic and has been drinking for the last several years daily and comes to ER complaining of bloody vomitus.  He has vomited several times and had gotten dizzy and even passed out and hit his head.  He has lip laceration which was repaired in the ER.  Patient was noted to have severe anemia.  He drinks around 5-6 beers a day.  He does not carry a diagnosis of cirrhosis and does not see a hepatologist.  He states that he was not admitted to the hospital for the last several years.  No prior history reported for alcohol withdrawal.  He keeps asking for pain medication and states that he takes Norco pills and has pain in the back and the neck and needs his pain pill urgently.  Overall not a great historian otherwise and does not seem to be closely following medical recommendation     Work-up showed concern for acute GI bleed with severe hypotension requiring volume resuscitation.  We have been requested to be admit patient to ICU.      Hospital course:  Despite aggressive care, Mr. Lopez failed to improve much, family opted for palliative measures

## (undated) DEVICE — LN SMPL CO2 SHTRM SD STREAM W/M LUER

## (undated) DEVICE — MULTIPLE BAND LIGATOR: Brand: SPEEDBAND SUPERVIEW SUPER 7

## (undated) DEVICE — CANN O2 ETCO2 FITS ALL CONN CO2 SMPL A/ 7IN DISP LF

## (undated) DEVICE — ADAPT CLN BIOGUARD AIR/H2O DISP

## (undated) DEVICE — LIGATOR MULTIBAND SUPERVIEW 7 BX4

## (undated) DEVICE — TUBING, SUCTION, 1/4" X 10', STRAIGHT: Brand: MEDLINE

## (undated) DEVICE — BITEBLOCK OMNI BLOC

## (undated) DEVICE — KT ORCA ORCAPOD DISP STRL

## (undated) DEVICE — SENSR O2 OXIMAX FNGR A/ 18IN NONSTR